# Patient Record
Sex: FEMALE | Race: WHITE | URBAN - METROPOLITAN AREA
[De-identification: names, ages, dates, MRNs, and addresses within clinical notes are randomized per-mention and may not be internally consistent; named-entity substitution may affect disease eponyms.]

---

## 2017-04-10 ENCOUNTER — INPATIENT (INPATIENT)
Facility: HOSPITAL | Age: 77
LOS: 4 days | Discharge: HOME | End: 2017-04-15
Attending: INTERNAL MEDICINE | Admitting: INTERNAL MEDICINE
Payer: MEDICARE

## 2017-04-10 VITALS
DIASTOLIC BLOOD PRESSURE: 130 MMHG | SYSTOLIC BLOOD PRESSURE: 147 MMHG | RESPIRATION RATE: 18 BRPM | OXYGEN SATURATION: 99 % | HEART RATE: 50 BPM | TEMPERATURE: 98 F

## 2017-04-10 LAB
ALBUMIN SERPL ELPH-MCNC: 3.8 G/DL — SIGNIFICANT CHANGE UP (ref 3.3–5)
ALP SERPL-CCNC: 79 U/L — SIGNIFICANT CHANGE UP (ref 40–120)
ALT FLD-CCNC: 33 U/L — SIGNIFICANT CHANGE UP (ref 12–78)
ANION GAP SERPL CALC-SCNC: 10 MMOL/L — SIGNIFICANT CHANGE UP (ref 5–17)
AST SERPL-CCNC: 34 U/L — SIGNIFICANT CHANGE UP (ref 15–37)
BASOPHILS # BLD AUTO: 0.1 K/UL — SIGNIFICANT CHANGE UP (ref 0–0.2)
BASOPHILS NFR BLD AUTO: 0.9 % — SIGNIFICANT CHANGE UP (ref 0–2)
BILIRUB SERPL-MCNC: 0.8 MG/DL — SIGNIFICANT CHANGE UP (ref 0.2–1.2)
BUN SERPL-MCNC: 14 MG/DL — SIGNIFICANT CHANGE UP (ref 7–23)
CALCIUM SERPL-MCNC: 8.9 MG/DL — SIGNIFICANT CHANGE UP (ref 8.5–10.1)
CHLORIDE SERPL-SCNC: 100 MMOL/L — SIGNIFICANT CHANGE UP (ref 96–108)
CK SERPL-CCNC: 117 U/L — SIGNIFICANT CHANGE UP (ref 26–192)
CO2 SERPL-SCNC: 26 MMOL/L — SIGNIFICANT CHANGE UP (ref 22–31)
CREAT SERPL-MCNC: 0.86 MG/DL — SIGNIFICANT CHANGE UP (ref 0.5–1.3)
EOSINOPHIL # BLD AUTO: 0.1 K/UL — SIGNIFICANT CHANGE UP (ref 0–0.5)
EOSINOPHIL NFR BLD AUTO: 0.7 % — SIGNIFICANT CHANGE UP (ref 0–6)
GLUCOSE SERPL-MCNC: 132 MG/DL — HIGH (ref 70–99)
HCT VFR BLD CALC: 45.2 % — HIGH (ref 34.5–45)
HGB BLD-MCNC: 15.4 G/DL — SIGNIFICANT CHANGE UP (ref 11.5–15.5)
LYMPHOCYTES # BLD AUTO: 15.4 % — SIGNIFICANT CHANGE UP (ref 13–44)
LYMPHOCYTES # BLD AUTO: 2.2 K/UL — SIGNIFICANT CHANGE UP (ref 1–3.3)
MCHC RBC-ENTMCNC: 27.5 PG — SIGNIFICANT CHANGE UP (ref 27–34)
MCHC RBC-ENTMCNC: 34 GM/DL — SIGNIFICANT CHANGE UP (ref 32–36)
MCV RBC AUTO: 81.1 FL — SIGNIFICANT CHANGE UP (ref 80–100)
MONOCYTES # BLD AUTO: 1.1 K/UL — HIGH (ref 0–0.9)
MONOCYTES NFR BLD AUTO: 7.5 % — SIGNIFICANT CHANGE UP (ref 2–14)
NEUTROPHILS # BLD AUTO: 10.8 K/UL — HIGH (ref 1.8–7.4)
NEUTROPHILS NFR BLD AUTO: 75.6 % — SIGNIFICANT CHANGE UP (ref 43–77)
PLATELET # BLD AUTO: 350 K/UL — SIGNIFICANT CHANGE UP (ref 150–400)
POTASSIUM SERPL-MCNC: 4 MMOL/L — SIGNIFICANT CHANGE UP (ref 3.5–5.3)
POTASSIUM SERPL-SCNC: 4 MMOL/L — SIGNIFICANT CHANGE UP (ref 3.5–5.3)
PROT SERPL-MCNC: 7.9 GM/DL — SIGNIFICANT CHANGE UP (ref 6–8.3)
RBC # BLD: 5.58 M/UL — HIGH (ref 3.8–5.2)
RBC # FLD: 13.9 % — SIGNIFICANT CHANGE UP (ref 10.3–14.5)
SODIUM SERPL-SCNC: 136 MMOL/L — SIGNIFICANT CHANGE UP (ref 135–145)
TROPONIN I SERPL-MCNC: <0.015 NG/ML — SIGNIFICANT CHANGE UP (ref 0.01–0.04)
WBC # BLD: 14.2 K/UL — HIGH (ref 3.8–10.5)
WBC # FLD AUTO: 14.2 K/UL — HIGH (ref 3.8–10.5)

## 2017-04-10 PROCEDURE — 93010 ELECTROCARDIOGRAM REPORT: CPT

## 2017-04-10 PROCEDURE — 71020: CPT | Mod: 26

## 2017-04-10 RX ADMIN — Medication 0.5 MILLIGRAM(S): at 23:11

## 2017-04-10 NOTE — ED PROVIDER NOTE - CONSTITUTIONAL, MLM
normal... Well appearing, well nourished, awake, alert, oriented to person, place, time/situation and in no apparent distress. Pt appears anxious.

## 2017-04-10 NOTE — ED STATDOCS - OBJECTIVE STATEMENT
75 y/o F with hx of HTN and anxiety presents to the ED c/o cough, and burning sensation, N/V. Pt states she started taking zoloft 2 weeks ago. Pt states she saw her PMD today for URI symptoms and panic attack this week, she was given nebulizer and took 1 zoloft, then when she got home she had episode of vomiting and burning sensation. Daughter also notes pt had abnormal EKG at PMD's office. Currently pt has no other complaints and denies fever.

## 2017-04-10 NOTE — ED ADULT NURSE NOTE - OBJECTIVE STATEMENT
Patient states she has had a cough for the past two weeks. Was at her PMD this morning was given half of an albuterol treatment and half a xanax, Patient states she has anxiety that she has been treated for in the past. When she got home from PMD she vomited.

## 2017-04-10 NOTE — ED ADULT NURSE REASSESSMENT NOTE - NS ED NURSE REASSESS COMMENT FT1
Report taken at the change of shift. Pt awake alert and oriented x4 resting comfortably in bed with no acute distress noted. Tachycardia noted on monitor. Denies any pain or complaints. Family at bedside. Dr. Omalley at bedside evaluating the pt. Will cont to monitor for safety and comfort.

## 2017-04-10 NOTE — ED PROVIDER NOTE - CARE PLAN
Principal Discharge DX:	Chest pain  Secondary Diagnosis:	Arrhythmia  Secondary Diagnosis:	Essential hypertension

## 2017-04-10 NOTE — ED PROVIDER NOTE - OBJECTIVE STATEMENT
77 y/o female with PMhx of anxiety, depression, HTN presents to the ED c/o increasing cough, generalized fatigue and increased stress that started 2 week ago. Pt states that she saw Dr. Ortega(PMD) a few weeks ago and was switched from xanax to zoloft. She states that her cough worsened and she saw Dr. Ortega again today whi gave her a nebulizer tx secondary to wheezing and told her that her "heart beat was irregular". Pt reports when coming home she started to have chest pain described as a burning with associated nausea and vomiting. After the episode of vomiting she ate chocolate, vomited again and then "felt warm and afraid". Currently pt has no other complaints states that the chest pain has resolved and denies abd pain, fever and SOB. PMD Dr. Ortega.

## 2017-04-10 NOTE — ED PROVIDER NOTE - MUSCULOSKELETAL, MLM
Spine appears normal, range of motion is not limited, no muscle or joint tenderness. No bilateral LE edema.

## 2017-04-10 NOTE — ED PROVIDER NOTE - NS ED MD SCRIBE ATTENDING SCRIBE SECTIONS
HISTORY OF PRESENT ILLNESS/DISPOSITION/PROGRESS NOTE/RESULTS/REVIEW OF SYSTEMS/PAST MEDICAL/SURGICAL/SOCIAL HISTORY/PHYSICAL EXAM

## 2017-04-10 NOTE — ED STATDOCS - PROGRESS NOTE DETAILS
Lnidsay Massey: 77 y/o F with hx of HTN and anxiety presents to the ED c/o cough, and burning sensation, N/V. Pt states she started taking zoloft 2 weeks ago. Pt states she saw her PMD today for URI symptoms and panic attack this week, she was given nebulizer and took 1 zoloft, then when she got home she had episode of vomiting and burning sensation. Daughter also notes pt had abnormal EKG at PMD's office. Currently pt has no other complaints and denies fever. Pt to be sent to main ED for further evaluation.

## 2017-04-10 NOTE — ED PROVIDER NOTE - MEDICAL DECISION MAKING DETAILS
75 yo f with hx htn, with recent cough, here for episode of chest pain with vomiting.  Pt in second degree heart block but is tachycardic.To be admitted and treated.

## 2017-04-11 DIAGNOSIS — I47.1 SUPRAVENTRICULAR TACHYCARDIA: ICD-10-CM

## 2017-04-11 LAB
ALBUMIN SERPL ELPH-MCNC: 3.5 G/DL — SIGNIFICANT CHANGE UP (ref 3.3–5)
ALP SERPL-CCNC: 73 U/L — SIGNIFICANT CHANGE UP (ref 40–120)
ALT FLD-CCNC: 25 U/L — SIGNIFICANT CHANGE UP (ref 12–78)
ANION GAP SERPL CALC-SCNC: 12 MMOL/L — SIGNIFICANT CHANGE UP (ref 5–17)
APPEARANCE UR: (no result)
AST SERPL-CCNC: 26 U/L — SIGNIFICANT CHANGE UP (ref 15–37)
BACTERIA # UR AUTO: (no result)
BASOPHILS # BLD AUTO: 0.1 K/UL — SIGNIFICANT CHANGE UP (ref 0–0.2)
BASOPHILS NFR BLD AUTO: 1.1 % — SIGNIFICANT CHANGE UP (ref 0–2)
BILIRUB SERPL-MCNC: 1.2 MG/DL — SIGNIFICANT CHANGE UP (ref 0.2–1.2)
BILIRUB UR-MCNC: NEGATIVE — SIGNIFICANT CHANGE UP
BUN SERPL-MCNC: 12 MG/DL — SIGNIFICANT CHANGE UP (ref 7–23)
CALCIUM SERPL-MCNC: 8.8 MG/DL — SIGNIFICANT CHANGE UP (ref 8.5–10.1)
CHLORIDE SERPL-SCNC: 101 MMOL/L — SIGNIFICANT CHANGE UP (ref 96–108)
CHOLEST SERPL-MCNC: 214 MG/DL — HIGH (ref 10–199)
CK SERPL-CCNC: 133 U/L — SIGNIFICANT CHANGE UP (ref 26–192)
CK SERPL-CCNC: 196 U/L — HIGH (ref 26–192)
CO2 SERPL-SCNC: 25 MMOL/L — SIGNIFICANT CHANGE UP (ref 22–31)
COLOR SPEC: YELLOW — SIGNIFICANT CHANGE UP
CREAT SERPL-MCNC: 0.84 MG/DL — SIGNIFICANT CHANGE UP (ref 0.5–1.3)
DIFF PNL FLD: (no result)
EOSINOPHIL # BLD AUTO: 0.1 K/UL — SIGNIFICANT CHANGE UP (ref 0–0.5)
EOSINOPHIL NFR BLD AUTO: 1.3 % — SIGNIFICANT CHANGE UP (ref 0–6)
EPI CELLS # UR: (no result)
GLUCOSE SERPL-MCNC: 130 MG/DL — HIGH (ref 70–99)
GLUCOSE UR QL: NEGATIVE MG/DL — SIGNIFICANT CHANGE UP
HCT VFR BLD CALC: 44.3 % — SIGNIFICANT CHANGE UP (ref 34.5–45)
HDLC SERPL-MCNC: 40 MG/DL — SIGNIFICANT CHANGE UP (ref 40–125)
HGB BLD-MCNC: 15.1 G/DL — SIGNIFICANT CHANGE UP (ref 11.5–15.5)
INR BLD: 1.21 RATIO — HIGH (ref 0.88–1.16)
KETONES UR-MCNC: NEGATIVE — SIGNIFICANT CHANGE UP
LEUKOCYTE ESTERASE UR-ACNC: (no result)
LIPID PNL WITH DIRECT LDL SERPL: 142 MG/DL — HIGH
LYMPHOCYTES # BLD AUTO: 2.4 K/UL — SIGNIFICANT CHANGE UP (ref 1–3.3)
LYMPHOCYTES # BLD AUTO: 21.8 % — SIGNIFICANT CHANGE UP (ref 13–44)
MAGNESIUM SERPL-MCNC: 2.4 MG/DL — SIGNIFICANT CHANGE UP (ref 1.8–2.4)
MCHC RBC-ENTMCNC: 27.9 PG — SIGNIFICANT CHANGE UP (ref 27–34)
MCHC RBC-ENTMCNC: 34.1 GM/DL — SIGNIFICANT CHANGE UP (ref 32–36)
MCV RBC AUTO: 81.9 FL — SIGNIFICANT CHANGE UP (ref 80–100)
MONOCYTES # BLD AUTO: 1 K/UL — HIGH (ref 0–0.9)
MONOCYTES NFR BLD AUTO: 9.3 % — SIGNIFICANT CHANGE UP (ref 2–14)
NEUTROPHILS # BLD AUTO: 7.3 K/UL — SIGNIFICANT CHANGE UP (ref 1.8–7.4)
NEUTROPHILS NFR BLD AUTO: 66.5 % — SIGNIFICANT CHANGE UP (ref 43–77)
NITRITE UR-MCNC: NEGATIVE — SIGNIFICANT CHANGE UP
PH UR: 6 — SIGNIFICANT CHANGE UP (ref 4.8–8)
PHOSPHATE SERPL-MCNC: 3.3 MG/DL — SIGNIFICANT CHANGE UP (ref 2.5–4.5)
PLATELET # BLD AUTO: 320 K/UL — SIGNIFICANT CHANGE UP (ref 150–400)
POTASSIUM SERPL-MCNC: 3.8 MMOL/L — SIGNIFICANT CHANGE UP (ref 3.5–5.3)
POTASSIUM SERPL-SCNC: 3.8 MMOL/L — SIGNIFICANT CHANGE UP (ref 3.5–5.3)
PROT SERPL-MCNC: 7.6 GM/DL — SIGNIFICANT CHANGE UP (ref 6–8.3)
PROT UR-MCNC: 15 MG/DL
PROTHROM AB SERPL-ACNC: 13.1 SEC — HIGH (ref 9.8–12.7)
RAPID RVP RESULT: SIGNIFICANT CHANGE UP
RBC # BLD: 5.41 M/UL — HIGH (ref 3.8–5.2)
RBC # FLD: 14.1 % — SIGNIFICANT CHANGE UP (ref 10.3–14.5)
RBC CASTS # UR COMP ASSIST: (no result) /HPF (ref 0–4)
SODIUM SERPL-SCNC: 138 MMOL/L — SIGNIFICANT CHANGE UP (ref 135–145)
SP GR SPEC: 1.01 — SIGNIFICANT CHANGE UP (ref 1.01–1.02)
TOTAL CHOLESTEROL/HDL RATIO MEASUREMENT: 5.4 RATIO — SIGNIFICANT CHANGE UP (ref 3.3–7.1)
TRIGL SERPL-MCNC: 160 MG/DL — HIGH (ref 10–149)
TROPONIN I SERPL-MCNC: <0.015 NG/ML — SIGNIFICANT CHANGE UP (ref 0.01–0.04)
UROBILINOGEN FLD QL: NEGATIVE MG/DL — SIGNIFICANT CHANGE UP
WBC # BLD: 11 K/UL — HIGH (ref 3.8–10.5)
WBC # FLD AUTO: 11 K/UL — HIGH (ref 3.8–10.5)
WBC UR QL: >50

## 2017-04-11 PROCEDURE — 93306 TTE W/DOPPLER COMPLETE: CPT | Mod: 26

## 2017-04-11 PROCEDURE — 93010 ELECTROCARDIOGRAM REPORT: CPT

## 2017-04-11 PROCEDURE — 99285 EMERGENCY DEPT VISIT HI MDM: CPT

## 2017-04-11 RX ORDER — ACETAMINOPHEN 500 MG
650 TABLET ORAL EVERY 6 HOURS
Qty: 0 | Refills: 0 | Status: DISCONTINUED | OUTPATIENT
Start: 2017-04-11 | End: 2017-04-15

## 2017-04-11 RX ORDER — SERTRALINE 25 MG/1
50 TABLET, FILM COATED ORAL DAILY
Qty: 0 | Refills: 0 | Status: DISCONTINUED | OUTPATIENT
Start: 2017-04-11 | End: 2017-04-15

## 2017-04-11 RX ORDER — ASPIRIN/CALCIUM CARB/MAGNESIUM 324 MG
81 TABLET ORAL DAILY
Qty: 0 | Refills: 0 | Status: DISCONTINUED | OUTPATIENT
Start: 2017-04-11 | End: 2017-04-15

## 2017-04-11 RX ORDER — SODIUM CHLORIDE 9 MG/ML
1000 INJECTION INTRAMUSCULAR; INTRAVENOUS; SUBCUTANEOUS
Qty: 0 | Refills: 0 | Status: DISCONTINUED | OUTPATIENT
Start: 2017-04-11 | End: 2017-04-15

## 2017-04-11 RX ORDER — ENOXAPARIN SODIUM 100 MG/ML
30 INJECTION SUBCUTANEOUS EVERY 24 HOURS
Qty: 0 | Refills: 0 | Status: DISCONTINUED | OUTPATIENT
Start: 2017-04-11 | End: 2017-04-15

## 2017-04-11 RX ORDER — AMLODIPINE BESYLATE 2.5 MG/1
10 TABLET ORAL DAILY
Qty: 0 | Refills: 0 | Status: DISCONTINUED | OUTPATIENT
Start: 2017-04-11 | End: 2017-04-15

## 2017-04-11 RX ADMIN — AMLODIPINE BESYLATE 10 MILLIGRAM(S): 2.5 TABLET ORAL at 05:49

## 2017-04-11 RX ADMIN — SERTRALINE 50 MILLIGRAM(S): 25 TABLET, FILM COATED ORAL at 14:36

## 2017-04-11 RX ADMIN — ENOXAPARIN SODIUM 30 MILLIGRAM(S): 100 INJECTION SUBCUTANEOUS at 23:45

## 2017-04-11 RX ADMIN — Medication 81 MILLIGRAM(S): at 12:44

## 2017-04-11 RX ADMIN — SODIUM CHLORIDE 50 MILLILITER(S): 9 INJECTION INTRAMUSCULAR; INTRAVENOUS; SUBCUTANEOUS at 05:59

## 2017-04-11 NOTE — CONSULT NOTE ADULT - SUBJECTIVE AND OBJECTIVE BOX
HPI:  77 yo F with PMH of HTN, anxiety, depression, p/w cough x 2 weeks. Patient states that she was prescribed zithromax and took it for 3 days. However, she developed diarrhea, so she stopped taking it. After that she became constipated for 3 days, and then developed diarrhea again. Her cough kept getting worse. Today patient started new medication zoloft as per PCP, Dr. Ortega, is attempted to transition patient from xanax to zoloft. Later on she developed chest burning sensation and then had two episodes of vomiting. Presently CP has resolved. Patient got scared and came to ED.    Patient states that he was also told by Dr. Ortega that her heart beat was irregular.     Presently c/o feeling nervous / anxious, but denies any other medical complaints. Denies dysuria / increased frequency/ hematuria. (2017 04:43)      PAST MEDICAL & SURGICAL HISTORY:  HTN (hypertension)  Anxiety      REVIEW OF SYSTEMS:    General: Pt denies recent weight loss/fever/chills    Neurological: denies numbness or  sensation loss    HEENT: denies visual changes, no hearing loss, denies sore throat    Cardiovascular: denies chest pain/palpitations/leg edema    Respiratory and Thorax: denies SOB/cough/wheezing    Gastrointestinal: denies abdominal pain/diarrhea/constipation/bloody stool    Genitourinary: denies urinary frequency/urgency/ dysuria    Musculoskeletal: denies joint pain or swelling, denies restricted motion    Skin: denies rashes/sores    Endocrine: denies heat or cold intolerance/excessive thirst    Hematologic: denies abnormal bleeding  	    	  	    		        	    	            MEDICATIONS  (STANDING):  sertraline 50milliGRAM(s) Oral daily  amLODIPine   Tablet 10milliGRAM(s) Oral daily  levoFLOXacin IVPB  IV Intermittent   sodium chloride 0.9%. 1000milliLiter(s) IV Continuous <Continuous>  aspirin  chewable 81milliGRAM(s) Oral daily    MEDICATIONS  (PRN):  acetaminophen   Tablet 650milliGRAM(s) Oral every 6 hours PRN For Temp greater than 38 C (100.4 F)      Allergies    Inderal LA (Unknown)  penicillins (Unknown)    Intolerances        SOCIAL HISTORY:    FAMILY HISTORY:      Vital Signs Last 24 Hrs  T(C): 36.8, Max: 36.8 ( @ 06:53)  T(F): 98.2, Max: 98.2 ( @ 06:53)  HR: 93 (50 - 116)  BP: 139/63 (111/79 - 147/130)  BP(mean): 139 (139 - 139)  RR: 20 (18 - 20)  SpO2: 98% (96% - 99%)    PHYSICAL EXAM:    Constitutional: well developed, well nourished, no deformities and no acute distress    Neurological: Alert & Oriented x 3, ODONNELL, no focal deficits    HEENT: NC/AT, PERRLA, EOMI,  Neck supple.    Respiratory: CTA B/L, No wheezing/crackles/rhonchi    Cardiovascular: (+) S1 & S2, RRR, No m/r/g    Gastrointestinal: soft, NT, nondistended, (+) BS    Genitourinary: non distended bladder, voiding freely    Extremities: No pedal edema, No clubbing, No cyanosis    Skin:  normal skin color and pigmentation, no skin lesions            LABS:                        15.1   11.0  )-----------( 320      ( 2017 07:44 )             44.3     -    138  |  101  |  12  ----------------------------<  130<H>  3.8   |  25  |  0.84    Ca    8.8      2017 07:44  Phos  3.3     -  Mg     2.4         TPro  7.6  /  Alb  3.5  /  TBili  1.2  /  DBili  x   /  AST  26  /  ALT  25  /  AlkPhos  73  04-11    PT/INR - ( 2017 07:44 )   PT: 13.1 sec;   INR: 1.21 ratio           Urinalysis Basic - ( 10 Apr 2017 21:38 )    Color: Yellow / Appearance: Slightly Turbid / S.010 / pH: x  Gluc: x / Ketone: Negative  / Bili: Negative / Urobili: Negative mg/dL   Blood: x / Protein: 15 mg/dL / Nitrite: Negative   Leuk Esterase: Moderate / RBC: 11-25 /HPF / WBC >50   Sq Epi: x / Non Sq Epi: Moderate / Bacteria: Moderate        RADIOLOGY & ADDITIONAL STUDIES:    TELE:  EKG: HPI:  77 yo F with PMH of HTN, anxiety, depression, p/w cough x 2 weeks. Patient states that she was prescribed zithromax and took it for 3 days. However, she developed diarrhea, so she stopped taking it. After that she became constipated for 3 days, and then developed diarrhea again. Her cough kept getting worse. Today patient started new medication zoloft as per PCP, Dr. Ortega, is attempted to transition patient from xanax to zoloft. Later on she developed chest burning sensation and then had two episodes of vomiting. Presently CP has resolved. Patient got scared and came to ED.    Patient states that he was also told by Dr. Ortega that her heart beat was irregular.     Presently c/o feeling nervous / anxious, but denies any other medical complaints. Denies dysuria / increased frequency/ hematuria. (2017 04:43)      PAST MEDICAL & SURGICAL HISTORY:  HTN (hypertension)  Anxiety      REVIEW OF SYSTEMS:    General: Pt denies recent weight loss/fever/chills    Neurological: denies numbness or  sensation loss    HEENT: denies visual changes, no hearing loss, denies sore throat    Cardiovascular: denies chest pain/palpitations/leg edema    Respiratory and Thorax: denies SOB/cough/wheezing    Gastrointestinal: denies abdominal pain/diarrhea/constipation/bloody stool    Genitourinary: denies urinary frequency/urgency/ dysuria    Musculoskeletal: denies joint pain or swelling, denies restricted motion    Skin: denies rashes/sores    Endocrine: denies heat or cold intolerance/excessive thirst    Hematologic: denies abnormal bleeding  	    MEDICATIONS  (STANDING):  sertraline 50milliGRAM(s) Oral daily  amLODIPine   Tablet 10milliGRAM(s) Oral daily  levoFLOXacin IVPB  IV Intermittent   sodium chloride 0.9%. 1000milliLiter(s) IV Continuous <Continuous>  aspirin  chewable 81milliGRAM(s) Oral daily    MEDICATIONS  (PRN):  acetaminophen   Tablet 650milliGRAM(s) Oral every 6 hours PRN For Temp greater than 38 C (100.4 F)      Allergies    Inderal LA (Unknown)  penicillins (Unknown)    Intolerances      Vital Signs Last 24 Hrs  T(C): 36.8, Max: 36.8 ( @ 06:53)  T(F): 98.2, Max: 98.2 ( @ 06:53)  HR: 93 (50 - 116)  BP: 139/63 (111/79 - 147/130)  BP(mean): 139 (139 - 139)  RR: 20 (18 - 20)  SpO2: 98% (96% - 99%)    PHYSICAL EXAM:    Constitutional: well developed, well nourished, no deformities and no acute distress    Neurological: Alert & Oriented x 3, ODONNELL, no focal deficits    HEENT: NC/AT, PERRLA, EOMI,  Neck supple.    Respiratory: CTA B/L, No wheezing/crackles/rhonchi    Cardiovascular: (+) S1 & S2, RRR, No m/r/g    Gastrointestinal: soft, NT, nondistended, (+) BS    Genitourinary: non distended bladder, voiding freely    Extremities: No pedal edema, No clubbing, No cyanosis    Skin:  normal skin color and pigmentation, no skin lesions            LABS:                        15.1   11.0  )-----------( 320      ( 2017 07:44 )             44.3     04-11    138  |  101  |  12  ----------------------------<  130<H>  3.8   |  25  |  0.84    Ca    8.8      2017 07:44  Phos  3.3     04-11  Mg     2.4     04-11    TPro  7.6  /  Alb  3.5  /  TBili  1.2  /  DBili  x   /  AST  26  /  ALT  25  /  AlkPhos  73  04-11    PT/INR - ( 2017 07:44 )   PT: 13.1 sec;   INR: 1.21 ratio           Urinalysis Basic - ( 10 Apr 2017 21:38 )    Color: Yellow / Appearance: Slightly Turbid / S.010 / pH: x  Gluc: x / Ketone: Negative  / Bili: Negative / Urobili: Negative mg/dL   Blood: x / Protein: 15 mg/dL / Nitrite: Negative   Leuk Esterase: Moderate / RBC: 11-25 /HPF / WBC >50   Sq Epi: x / Non Sq Epi: Moderate / Bacteria: Moderate        RADIOLOGY & ADDITIONAL STUDIES:    TELE: -120 bpm MAT    EKG:sinus tachycardia 114bpm, variable MI interval,  HPI:  77 yo F with PMH of HTN, anxiety, depression, p/w cough x 2 weeks. Patient states that she was prescribed zithromax and took it for 3 days. However, she developed diarrhea, so she stopped taking it. After that she became constipated for 3 days, and then developed diarrhea again. Her cough kept getting worse. Today patient started new medication zoloft as per PCP, Dr. Ortega, is attempted to transition patient from xanax to zoloft. Later on she developed chest burning sensation and then had two episodes of vomiting. Presently CP has resolved. Patient got scared and came to ED.    Patient states that he was also told by Dr. Ortega that her heart beat was irregular.     Presently c/o feeling nervous / anxious, but denies any other medical complaints. Denies dysuria / increased frequency/ hematuria. (2017 04:43)      PAST MEDICAL & SURGICAL HISTORY:  HTN (hypertension)  Anxiety      REVIEW OF SYSTEMS:    General: Pt denies recent weight loss/fever/chills    Neurological: denies numbness or  sensation loss    HEENT: denies visual changes, no hearing loss, denies sore throat    Cardiovascular: denies chest pain/palpitations/leg edema    Respiratory and Thorax: denies SOB/wheezing (+) non productive cough    Gastrointestinal: denies abdominal pain/diarrhea/constipation/bloody stool    Genitourinary: denies urinary frequency/urgency/ dysuria    Musculoskeletal: denies joint pain or swelling, denies restricted motion    Skin: denies rashes/sores    Endocrine: denies heat or cold intolerance/excessive thirst    Hematologic: denies abnormal bleeding  	    MEDICATIONS  (STANDING):  sertraline 50milliGRAM(s) Oral daily  amLODIPine   Tablet 10milliGRAM(s) Oral daily  levoFLOXacin IVPB  IV Intermittent   sodium chloride 0.9%. 1000milliLiter(s) IV Continuous <Continuous>  aspirin  chewable 81milliGRAM(s) Oral daily    MEDICATIONS  (PRN):  acetaminophen   Tablet 650milliGRAM(s) Oral every 6 hours PRN For Temp greater than 38 C (100.4 F)      Allergies    Inderal LA (Unknown)  penicillins (Unknown)    Intolerances      Vital Signs Last 24 Hrs  T(C): 36.8, Max: 36.8 ( @ 06:53)  T(F): 98.2, Max: 98.2 (04-11 @ 06:53)  HR: 93 (50 - 116)  BP: 139/63 (111/79 - 147/130)  BP(mean): 139 (139 - 139)  RR: 20 (18 - 20)  SpO2: 98% (96% - 99%)    PHYSICAL EXAM:    Constitutional: well developed, well nourished, no deformities and no acute distress    Neurological: Alert & Oriented x 3, ODONNELL, no focal deficits    HEENT: NC/AT, PERRLA, EOMI,  Neck supple.    Respiratory: CTA B/L, No wheezing/crackles/rhonchi    Cardiovascular: irregular S1 & S2, No m/r/g    Gastrointestinal: soft, NT, nondistended, (+) BS    Genitourinary: non distended bladder, voiding freely    Extremities: No pedal edema, No clubbing, No cyanosis    Skin:  normal skin color and pigmentation, no skin lesions            LABS:                        15.1   11.0  )-----------( 320      ( 2017 07:44 )             44.3     -    138  |  101  |  12  ----------------------------<  130<H>  3.8   |  25  |  0.84    Ca    8.8      2017 07:44  Phos  3.3     -  Mg     2.4         TPro  7.6  /  Alb  3.5  /  TBili  1.2  /  DBili  x   /  AST  26  /  ALT  25  /  AlkPhos  73  04-11    PT/INR - ( 2017 07:44 )   PT: 13.1 sec;   INR: 1.21 ratio           Urinalysis Basic - ( 10 Apr 2017 21:38 )    Color: Yellow / Appearance: Slightly Turbid / S.010 / pH: x  Gluc: x / Ketone: Negative  / Bili: Negative / Urobili: Negative mg/dL   Blood: x / Protein: 15 mg/dL / Nitrite: Negative   Leuk Esterase: Moderate / RBC: 11-25 /HPF / WBC >50   Sq Epi: x / Non Sq Epi: Moderate / Bacteria: Moderate        RADIOLOGY & ADDITIONAL STUDIES:    TELE: -120 bpm MAT    EKG:sinus tachycardia 114bpm, variable MA interval,

## 2017-04-11 NOTE — CONSULT NOTE ADULT - ASSESSMENT
76F p/w SOB, cough, tachycardia.     1. Tachycardia- appears SR with prolonged ID, APCs. Elevated HR likely physiologic in setting of PNA, anxiety. EP eval pending. If HR does not become better controlled over next 24 hrs  will d/c norvasc and start lopressor 25mg BID.     2. SOB- likely from URI/PNA. Suggest noncontrast CT chest. Mgmt as per primary team.     3. HTN- well controlled. Cont norvasc for now.     4. Outpt cardiac w/u with me upon discharge.     5. DVT proph.     4.

## 2017-04-11 NOTE — H&P ADULT - ASSESSMENT
75 yo F with PMH of HTN, anxiety, depression, p/w cough x 2 weeks    *Sepsis 2/2 URI vs questionable CAP on RLL on CXR  -Final CXR report still pending  -levaquin   -RVP  -Blood cultures / sputum cultures   -IVF     *Tachycardia  -Possibly 2/2 anxiety?  -Will need to r/o underlying arrhythmia. Patient appears to have discernible P waves, but at times appears to be dissociated from QRS complex - possibly AV block? Will consult cardio for further input  -Patient also appears to alternate between tachycardia and bradycardia on tele monitor at times  -Echo    *Breif episode of CP  -ASA  -Tele monitoring  -Cardio consult  -MAGDY + Serial EKGs    *Depression / anxiety  -C/w zoloft    *Abnormal UA  -However, patient denies any UTI symptoms    *HTN  -Low salt diet  -C/w amlodipine  -As per patient, Enalapril is being held 2/2 cough    *Alternating constipation / diarrhea  -Possibly IBS?  -Continue to monitor, and outpatient f/u if symptoms improve    *DVT ppx  -SCDs 75 yo F with PMH of HTN, anxiety, depression, p/w cough x 2 weeks    *Sepsis 2/2 URI vs questionable CAP on RLL on CXR  -Final CXR report still pending  -levaquin   -RVP  -Blood cultures / sputum cultures   -IVF     *Tachycardia  -Possibly 2/2 anxiety?  -Will need to r/o underlying arrhythmia. Patient appears to have discernible P waves, but at times appears to be dissociated from QRS complex - possibly AV block? Will consult cardio for further input  -Patient also appears to alternate between tachycardia and bradycardia on tele monitor at times  -Echo    *Brief episode of CP  -ASA  -Tele monitoring  -Cardio consult  -MAGDY + Serial EKGs    *Depression / anxiety  -C/w zoloft    *Abnormal UA  -However, patient denies any UTI symptoms    *HTN  -Low salt diet  -C/w amlodipine  -As per patient, Enalapril is being held 2/2 cough    *Alternating constipation / diarrhea  -Possibly IBS?  -Continue to monitor, and outpatient f/u if symptoms improve    *DVT ppx  -SCDs

## 2017-04-11 NOTE — H&P ADULT - HISTORY OF PRESENT ILLNESS
75 yo F with PMH of HTN, anxiety, depression, p/w cough x 2 weeks. Patient states that she was prescribed zithromax and took it for 3 days. However, she developed diarrhea, so she stopped taking it. After that she became constipated for 3 days, and then developed diarrhea again. Her cough kept getting worse. Today patient started new medication zoloft as per PCP, Dr. Ortega, is attempted to transition patient from xanax to zoloft. Later on she developed chest burning sensation and then had two episodes of vomiting. Presently CP has resolved. Patient got scared and came to ED.    Patient states that he was also told by Dr. Ortega that her heart beat was irregular.     Presently c/o feeling nervous / anxious, but denies any other medical complaints. Denies dysuria / increased frequency/ hematuria.

## 2017-04-11 NOTE — CONSULT NOTE ADULT - SUBJECTIVE AND OBJECTIVE BOX
Cardiology Consultation    HPI:  77 yo F with PMH of HTN, anxiety, depression, p/w cough x 2 weeks. Patient states that she was prescribed zithromax and took it for 3 days. However, she developed diarrhea, so she stopped taking it. After that she became constipated for 3 days, and then developed diarrhea again. Her cough kept getting worse. Today patient started new medication zoloft as per PCP, Dr. Ortega, is attempted to transition patient from xanax to zoloft. Later on she developed chest burning sensation and then had two episodes of vomiting. Presently CP has resolved. Patient got scared and came to ED. Patient states that he was also told by Dr. Ortega that her heart beat was irregular.   Presently c/o feeling nervous / anxious, but denies any other medical complaints. Denies dysuria / increased frequency/ hematuria.     - No CP, SOB mildly improved. Pt very nervous/anxious. Tele- Stach with APCs.     PAST MEDICAL & SURGICAL HISTORY:  HTN (hypertension)  Anxiety    Allergies  Inderal LA (Unknown)  penicillins (Unknown)    Intolerances    SOCIAL HISTORY: Denies tobacco, etoh abuse or illicit drug use    FAMILY HISTORY: Noncontributory    MEDICATIONS  (STANDING):  sertraline 50milliGRAM(s) Oral daily  amLODIPine   Tablet 10milliGRAM(s) Oral daily  levoFLOXacin IVPB  IV Intermittent   sodium chloride 0.9%. 1000milliLiter(s) IV Continuous <Continuous>  aspirin  chewable 81milliGRAM(s) Oral daily    MEDICATIONS  (PRN):  acetaminophen   Tablet 650milliGRAM(s) Oral every 6 hours PRN For Temp greater than 38 C (100.4 F)    Vital Signs Last 24 Hrs  T(C): 36.8, Max: 36.8 ( @ 06:53)  T(F): 98.2, Max: 98.2 ( @ 06:53)  HR: 93 (50 - 116)  BP: 139/63 (111/79 - 147/130)  BP(mean): 139 (139 - 139)  RR: 20 (18 - 20)  SpO2: 98% (96% - 99%)    REVIEW OF SYSTEMS:    CONSTITUTIONAL:  As per HPI.  HEENT:  Eyes:  No diplopia or blurred vision. ENT:  No earache, sore throat or runny nose.  CARDIOVASCULAR:  No pressure, squeezing, strangling, tightness, heaviness or aching about the chest, neck, axilla or epigastrium.  RESPIRATORY:  No cough, shortness of breath, PND or orthopnea.  GASTROINTESTINAL:  No nausea, vomiting or diarrhea.  GENITOURINARY:  No dysuria, frequency or urgency.  MUSCULOSKELETAL:  As per HPI.  SKIN:  No change in skin, hair or nails.  NEUROLOGIC:  No paresthesias, fasciculations, seizures or weakness.  PSYCHIATRIC:  No disorder of thought or mood.  ENDOCRINE:  No heat or cold intolerance, polyuria or polydipsia.  HEMATOLOGICAL:  No easy bruising or bleedings.     PHYSICAL EXAMINATION:    GENERAL APPEARANCE:  Pt. is not currently dyspneic, in no distress. Pt. is alert, oriented, and pleasant.  HEENT:  Pupils are normal and react normally. No icterus. Mucous membranes well colored.  NECK:  Supple. No lymphadenopathy. Jugular venous pressure not elevated. Carotids equal.   HEART:   The cardiac impulse has a normal quality. There are no murmurs, rubs or gallops noted  CHEST:  Chest is clear to auscultation. Normal respiratory effort.  ABDOMEN:  Soft and nontender.   EXTREMITIES:  There is no edema.   SKIN:  No rash or significant lesions are noted.    I&O's Summary      LABS:                        15.4   14.2  )-----------( 350      ( 10 Apr 2017 21:38 )             45.2     04-10    136  |  100  |  14  ----------------------------<  132<H>  4.0   |  26  |  0.86    Ca    8.9      10 Apr 2017 21:38    TPro  7.9  /  Alb  3.8  /  TBili  0.8  /  DBili  x   /  AST  34  /  ALT  33  /  AlkPhos  79  04-10    LIVER FUNCTIONS - ( 10 Apr 2017 21:38 )  Alb: 3.8 g/dL / Pro: 7.9 gm/dL / ALK PHOS: 79 U/L / ALT: 33 U/L / AST: 34 U/L / GGT: x           PT/INR - ( 2017 07:44 )   PT: 13.1 sec;   INR: 1.21 ratio      CARDIAC MARKERS ( 10 Apr 2017 21:38 )  <0.015 ng/mL / x     / 117 U/L / x     / x        Urinalysis Basic - ( 10 Apr 2017 21:38 )    Color: Yellow / Appearance: Slightly Turbid / S.010 / pH: x  Gluc: x / Ketone: Negative  / Bili: Negative / Urobili: Negative mg/dL   Blood: x / Protein: 15 mg/dL / Nitrite: Negative   Leuk Esterase: Moderate / RBC: 11-25 /HPF / WBC >50   Sq Epi: x / Non Sq Epi: Moderate / Bacteria: Moderate    EKG: STach with prolonged AZ, APCs.     TELEMETRY: Stach    CARDIAC TESTS: pending    RADIOLOGY & ADDITIONAL STUDIES: pending    ASSESSMENT & PLAN:

## 2017-04-11 NOTE — PROGRESS NOTE ADULT - SUBJECTIVE AND OBJECTIVE BOX
PMD: Dr. Ortega    HPI:77 yo F with PMH of HTN, anxiety, depression, p/w cough x 2 weeks. Patient states that she was prescribed zithromax and took it for 3 days. However, she developed diarrhea, so she stopped taking it. After that she became constipated for 3 days, and then developed diarrhea again. Her cough kept getting worse. Today patient started new medication zoloft as per PCP, Dr. Ortega, is attempted to transition patient from xanax to zoloft. Later on she developed chest burning sensation and then had two episodes of vomiting. Presently CP has resolved. Patient got scared and came to ED.  Patient states that he was also told by Dr. Ortega that her heart beat was irregular.   Presently c/o feeling nervous / anxious,  but denies any other medical complaints. Denies dysuria / increased frequency/ hematuria.     4/11- reviewed.  Pt admitted this morning.  No events.     Vital Signs Last 24 Hrs  T(C): 36.5, Max: 36.8 (04-11 @ 06:53)  T(F): 97.7, Max: 98.2 (04-11 @ 06:53)  HR: 105 (50 - 116)  BP: 140/76 (111/79 - 147/130)  BP(mean): 139 (139 - 139)  RR: 18 (18 - 20)  SpO2: 94% (94% - 99%)        LABS: All Labs Reviewed:                        15.1   11.0  )-----------( 320      ( 11 Apr 2017 07:44 )             44.3     04-11    138  |  101  |  12  ----------------------------<  130<H>  3.8   |  25  |  0.84    Ca    8.8      11 Apr 2017 07:44  Phos  3.3     04-11  Mg     2.4     04-11    TPro  7.6  /  Alb  3.5  /  TBili  1.2  /  DBili  x   /  AST  26  /  ALT  25  /  AlkPhos  73  04-11    PT/INR - ( 11 Apr 2017 07:44 )   PT: 13.1 sec;   INR: 1.21 ratio           CARDIAC MARKERS ( 11 Apr 2017 12:18 )  x     / x     / 196 U/L / x     / x      CARDIAC MARKERS ( 11 Apr 2017 07:44 )  <0.015 ng/mL / x     / 133 U/L / x     / x      CARDIAC MARKERS ( 10 Apr 2017 21:38 )  <0.015 ng/mL / x     / 117 U/L / x     / x          CHEST P.A. LATERAL        04/10/2017    IMPRESSION:  No evidence of active chest disease.       MEDICATIONS  (STANDING):  sertraline 50milliGRAM(s) Oral daily  amLODIPine   Tablet 10milliGRAM(s) Oral daily  levoFLOXacin IVPB  IV Intermittent   sodium chloride 0.9%. 1000milliLiter(s) IV Continuous <Continuous>  aspirin  chewable 81milliGRAM(s) Oral daily    MEDICATIONS  (PRN):  acetaminophen   Tablet 650milliGRAM(s) Oral every 6 hours PRN For Temp greater than 38 C (100.4 F)        Assessment and Plan:   Assessment:  · Assessment		    77 yo F with PMH of HTN, anxiety, depression, p/w cough x 2 weeks    *cough, leukocytosis, URI  - CXR w/o infiltrate  - wbc improving, afebrile  - resp viral panel neg.   - continue IV levaquin for now   - cultures pending      *UTI  - on levaquin as above  - check urine culture and change abx pending sensitivities    *tachycardia  - monitor on tele   - trops neg  - echo pending  - Cardio and EP consults     *Brief episode of CP  -trops neg.   -aspirin  - ekg in am  - Cardio f/u appreciated     *Depression / anxiety  -C/w zoloft    *HTN  -Low salt diet  -C/w amlodipine  -As per patient, Enalapril is being held 2/2 cough    *Alternating constipation / diarrhea  -Possibly IBS?  -Continue to monitor, and outpatient f/u if symptoms improve    *DVT ppx  -SCDs

## 2017-04-11 NOTE — CONSULT NOTE ADULT - ASSESSMENT
75 yo F with PMH of HTN, anxiety, depression, p/w cough x 2 weeks. Patient states that she was prescribed zithromax and took it for 3 days. However, she developed diarrhea, so she stopped taking it. After that she became constipated for 3 days, and then developed diarrhea again. Her cough kept getting worse. Today patient started new medication zoloft as per PCP, Dr. Oretga, is attempted to transition patient from xanax to zoloft. Later on she developed chest burning sensation and then had two episodes of vomiting. Presently CP has resolved. Patient got scared and came to ED.    Patient states that he was also told by Dr. Ortega that her heart beat was irregular.     Presently c/o feeling nervous / anxious, but denies any other medical complaints. Denies dysuria / increased frequency/ hematuria. (11 Apr 2017 04:43)    EKG & telemetry reviewed : tachycardia 110-120's bpm , likely MAT associated with URI

## 2017-04-12 LAB
ANION GAP SERPL CALC-SCNC: 8 MMOL/L — SIGNIFICANT CHANGE UP (ref 5–17)
BUN SERPL-MCNC: 11 MG/DL — SIGNIFICANT CHANGE UP (ref 7–23)
CALCIUM SERPL-MCNC: 8.8 MG/DL — SIGNIFICANT CHANGE UP (ref 8.5–10.1)
CHLORIDE SERPL-SCNC: 102 MMOL/L — SIGNIFICANT CHANGE UP (ref 96–108)
CO2 SERPL-SCNC: 27 MMOL/L — SIGNIFICANT CHANGE UP (ref 22–31)
CREAT SERPL-MCNC: 0.67 MG/DL — SIGNIFICANT CHANGE UP (ref 0.5–1.3)
CULTURE RESULTS: SIGNIFICANT CHANGE UP
GLUCOSE SERPL-MCNC: 125 MG/DL — HIGH (ref 70–99)
HCT VFR BLD CALC: 43.4 % — SIGNIFICANT CHANGE UP (ref 34.5–45)
HGB BLD-MCNC: 14.4 G/DL — SIGNIFICANT CHANGE UP (ref 11.5–15.5)
MCHC RBC-ENTMCNC: 27.5 PG — SIGNIFICANT CHANGE UP (ref 27–34)
MCHC RBC-ENTMCNC: 33.2 GM/DL — SIGNIFICANT CHANGE UP (ref 32–36)
MCV RBC AUTO: 82.8 FL — SIGNIFICANT CHANGE UP (ref 80–100)
PLATELET # BLD AUTO: 264 K/UL — SIGNIFICANT CHANGE UP (ref 150–400)
POTASSIUM SERPL-MCNC: 3.7 MMOL/L — SIGNIFICANT CHANGE UP (ref 3.5–5.3)
POTASSIUM SERPL-SCNC: 3.7 MMOL/L — SIGNIFICANT CHANGE UP (ref 3.5–5.3)
RBC # BLD: 5.24 M/UL — HIGH (ref 3.8–5.2)
RBC # FLD: 13.9 % — SIGNIFICANT CHANGE UP (ref 10.3–14.5)
SODIUM SERPL-SCNC: 137 MMOL/L — SIGNIFICANT CHANGE UP (ref 135–145)
SPECIMEN SOURCE: SIGNIFICANT CHANGE UP
WBC # BLD: 10.3 K/UL — SIGNIFICANT CHANGE UP (ref 3.8–10.5)
WBC # FLD AUTO: 10.3 K/UL — SIGNIFICANT CHANGE UP (ref 3.8–10.5)

## 2017-04-12 PROCEDURE — 93010 ELECTROCARDIOGRAM REPORT: CPT

## 2017-04-12 RX ORDER — ALPRAZOLAM 0.25 MG
0.25 TABLET ORAL DAILY
Qty: 0 | Refills: 0 | Status: DISCONTINUED | OUTPATIENT
Start: 2017-04-12 | End: 2017-04-13

## 2017-04-12 RX ADMIN — ENOXAPARIN SODIUM 30 MILLIGRAM(S): 100 INJECTION SUBCUTANEOUS at 23:13

## 2017-04-12 RX ADMIN — AMLODIPINE BESYLATE 10 MILLIGRAM(S): 2.5 TABLET ORAL at 05:38

## 2017-04-12 RX ADMIN — Medication 81 MILLIGRAM(S): at 12:58

## 2017-04-12 NOTE — PROGRESS NOTE ADULT - SUBJECTIVE AND OBJECTIVE BOX
PMD: Dr. Ortega    HPI:75 yo F with PMH of HTN, anxiety, depression, p/w cough x 2 weeks. Patient states that she was prescribed zithromax and took it for 3 days. However, she developed diarrhea, so she stopped taking it. After that she became constipated for 3 days, and then developed diarrhea again. Her cough kept getting worse. Today patient started new medication zoloft as per PCP, Dr. Ortega, is attempted to transition patient from xanax to zoloft. Later on she developed chest burning sensation and then had two episodes of vomiting. Presently CP has resolved. Patient got scared and came to ED.  Patient states that he was also told by Dr. Ortega that her heart beat was irregular.   Presently c/o feeling nervous / anxious,  but denies any other medical complaints. Denies dysuria / increased frequency/ hematuria.     4/11- reviewed.  Pt admitted this morning.  No events.     4/12-Cough resolved.  Denies chest pain, sob, palpitations.  Denies urinary f/u/d or abd pain. Feeling anxious- states she has longstanding hx anxiety and was just started on zoloft Monday and is supposed to taper off xanax which she hasn't received here.   Tele w/ atrial tach. up to 150s.  Pt states she hasn't tolerated BB in past but was once on cardizem and tolerated well.     VVital Signs Last 24 Hrs  T(C): 36.9, Max: 36.9 (04-12 @ 10:36)  T(F): 98.4, Max: 98.4 (04-12 @ 10:36)  HR: 116 (103 - 116)  BP: 133/82 (133/82 - 171/87)  BP(mean): --  RR: 18 (18 - 18)  SpO2: 93% (93% - 97%)    T(C): 36.5, Max: 36.8 (04-11 @ 06:53)  T(F): 97.7, Max: 98.2 (04-11 @ 06:53)  HR: 105 (50 - 116)  BP: 140/76 (111/79 - 147/130)  BP(mean): 139 (139 - 139)  RR: 18 (18 - 20)  SpO2: 94% (94% - 99%)      PHYSICAL EXAM:    General: pleasant, resting comfortably in bed, nad  Neuro: AAOx3, no focal deficits  HEENT: NCAT  Neck: Soft and supple, No JVD  Respiratory: CTA b/l, no w/r/r  Cardiovascular: S1 and S2, tachy, no m/g/r  Gastrointestinal: +BS, soft, NTND, no rebound/guarding  Extremities: No c/c/e  Vascular: 2+ peripheral pulses  Musculoskeletal: 5/5 strength b/l UE and LE, sensation intact  Skin: No rashes            LABS: All Labs Reviewed:                        14.4   10.3  )-----------( 264      ( 12 Apr 2017 06:02 )             43.4     04-12    137  |  102  |  11  ----------------------------<  125<H>  3.7   |  27  |  0.67    Ca    8.8      12 Apr 2017 06:02  Phos  3.3     04-11  Mg     2.4     04-11    TPro  7.6  /  Alb  3.5  /  TBili  1.2  /  DBili  x   /  AST  26  /  ALT  25  /  AlkPhos  73  04-11    PT/INR - ( 11 Apr 2017 07:44 )   PT: 13.1 sec;   INR: 1.21 ratio           CARDIAC MARKERS ( 11 Apr 2017 12:18 )  x     / x     / 196 U/L / x     / x      CARDIAC MARKERS ( 11 Apr 2017 07:44 )  <0.015 ng/mL / x     / 133 U/L / x     / x      CARDIAC MARKERS ( 10 Apr 2017 21:38 )  <0.015 ng/mL / x     / 117 U/L / x     / x            Culture - Urine (04.10.17 @ 21:38)    Specimen Source: .Urine Clean Catch (Midstream)    Culture Results:   >100,000 CFU/ml Streptococcus agalactiae (Group B)      Culture - Blood (04.11.17 @ 07:44)    Specimen Source: .Blood None    Culture Results:   No growth to date.    CHEST P.A. LATERAL        No evidence of active chest disease.     04/10/2017      :  2D ECHOCARDIOGRAM AD   04/11/2017  Summary   In the presence of a cardiac arrhythmia left ventricle systolic function   appears preserved. Estimated left ventricular ejection fraction is >60 %.   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   Unable to rule out Right ventricular aneurysm. Suggest Cardiac MRI.      MEDICATIONS  (STANDING):  sertraline 50milliGRAM(s) Oral daily  amLODIPine   Tablet 10milliGRAM(s) Oral daily  levoFLOXacin IVPB  IV Intermittent   levoFLOXacin IVPB 500milliGRAM(s) IV Intermittent every 24 hours  sodium chloride 0.9%. 1000milliLiter(s) IV Continuous <Continuous>  aspirin  chewable 81milliGRAM(s) Oral daily  enoxaparin Injectable 30milliGRAM(s) SubCutaneous every 24 hours  diltiazem    Tablet 30milliGRAM(s) Oral three times a day    MEDICATIONS  (PRN):  acetaminophen   Tablet 650milliGRAM(s) Oral every 6 hours PRN For Temp greater than 38 C (100.4 F)          Assessment and Plan:   Assessment:  · Assessment		    75 yo F with PMH of HTN, anxiety, depression, p/w cough x 2 weeks    *cough, leukocytosis, possible URI  - pt denies cough today, wbc improved, afebrile  - CXR w/o infiltrate  - resp viral panel neg.   - continue IV levaquin for now   - cultures pending      *UTI +strep  - on levaquin as above  - change abx pending urine cx sensitivities    *tachycardia, multifocal atrial tach  - Cardio, EP f/u appreciated   - monitor on tele   - trops neg  - echo preserved LVEF, ? RV aneurysm- cardiac MRI recommended, will d/w Cardio   - 4/12 hr up to 150s on tele.  will start cardizem now  (pt refuses BB)      *Brief episode of CP  - resolved   -trops neg.   -aspirin  - ekg in am  - Cardio f/u appreciated     *Depression / anxiety  -C/w zoloft (as per pt she started this Monday and is tapering off xanax)    *HTN  -Low salt diet  -C/w amlodipine  -As per patient, Enalapril is being held 2/2 cough    *Alternating constipation / diarrhea  -Possibly IBS?  -Continue to monitor, and outpatient f/u if symptoms improve    *DVT ppx  -SCDs

## 2017-04-13 PROCEDURE — 93010 ELECTROCARDIOGRAM REPORT: CPT

## 2017-04-13 RX ORDER — ALPRAZOLAM 0.25 MG
0.25 TABLET ORAL
Qty: 0 | Refills: 0 | Status: DISCONTINUED | OUTPATIENT
Start: 2017-04-13 | End: 2017-04-15

## 2017-04-13 RX ADMIN — AMLODIPINE BESYLATE 10 MILLIGRAM(S): 2.5 TABLET ORAL at 06:03

## 2017-04-13 RX ADMIN — Medication 0.25 MILLIGRAM(S): at 18:53

## 2017-04-13 RX ADMIN — Medication 81 MILLIGRAM(S): at 13:17

## 2017-04-13 RX ADMIN — ENOXAPARIN SODIUM 30 MILLIGRAM(S): 100 INJECTION SUBCUTANEOUS at 23:16

## 2017-04-13 NOTE — PROGRESS NOTE ADULT - SUBJECTIVE AND OBJECTIVE BOX
PMD: Dr. Ortega    HPI:75 yo F with PMH of HTN, anxiety, depression, p/w cough x 2 weeks. Patient states that she was prescribed zithromax and took it for 3 days. However, she developed diarrhea, so she stopped taking it. After that she became constipated for 3 days, and then developed diarrhea again. Her cough kept getting worse. Today patient started new medication zoloft as per PCP, Dr. Ortega, is attempted to transition patient from xanax to zoloft. Later on she developed chest burning sensation and then had two episodes of vomiting. Presently CP has resolved. Patient got scared and came to ED.  Patient states that he was also told by Dr. Ortega that her heart beat was irregular.   Presently c/o feeling nervous / anxious,  but denies any other medical complaints. Denies dysuria / increased frequency/ hematuria.     4/11- reviewed.  Pt admitted this morning.  No events.     4/12-Cough resolved.  Denies chest pain, sob, palpitations.  Denies urinary f/u/d or abd pain. Feeling anxious- states she has longstanding hx anxiety and was just started on zoloft Monday and is supposed to taper off xanax which she hasn't received here.   Tele w/ atrial tach. up to 150s.  Pt states she hasn't tolerated BB in past but was once on cardizem and tolerated well.     4/13- tele w/ atrial tach up to 150s- discussed w/ patient and she denies having chest pain, sob, palp.  States she was only anxious overnight b/c of events w/ her roommate.  Wants to stop zoloft and just take xanax bid.     Vital Signs Last 24 Hrs  T(C): 36.7, Max: 36.8 (04-12 @ 17:43)  T(F): 98, Max: 98.3 (04-12 @ 17:43)  HR: 110 (90 - 114)  BP: 148/88 (146/71 - 163/89)  BP(mean): --  RR: --  SpO2: 97% (97% - 97%)      PHYSICAL EXAM:  General: pleasant, resting comfortably in bed, nad  Neuro: AAOx3, no focal deficits  HEENT: NCAT  Neck: Soft and supple, No JVD  Respiratory: CTA b/l, no w/r/r  Cardiovascular: S1 and S2, tachy, no m/g/r  Gastrointestinal: +BS, soft, NTND, no rebound/guarding  Extremities: No c/c/e  Vascular: 2+ peripheral pulses  Musculoskeletal: 5/5 strength b/l UE and LE, sensation intact  Skin: No rashes        LABS: All Labs Reviewed:                        14.4   10.3  )-----------( 264      ( 12 Apr 2017 06:02 )             43.4     04-12    137  |  102  |  11  ----------------------------<  125<H>  3.7   |  27  |  0.67    Ca    8.8      12 Apr 2017 06:02      Culture - Blood (04.11.17 @ 07:44)    Specimen Source: .Blood None    Culture Results:   No growth to date.    Culture - Urine (04.10.17 @ 21:38)    Specimen Source: .Urine Clean Catch (Midstream)    Culture Results:   >100,000 CFU/ml Streptococcus agalactiae (Group B)                            14.4   10.3  )-----------( 264      ( 12 Apr 2017 06:02 )             43.4     04-12    137  |  102  |  11  ----------------------------<  125<H>  3.7   |  27  |  0.67    Ca    8.8      12 Apr 2017 06:02  Phos  3.3     04-11  Mg     2.4     04-11    TPro  7.6  /  Alb  3.5  /  TBili  1.2  /  DBili  x   /  AST  26  /  ALT  25  /  AlkPhos  73  04-11    PT/INR - ( 11 Apr 2017 07:44 )   PT: 13.1 sec;   INR: 1.21 ratio           CARDIAC MARKERS ( 11 Apr 2017 12:18 )  x     / x     / 196 U/L / x     / x      CARDIAC MARKERS ( 11 Apr 2017 07:44 )  <0.015 ng/mL / x     / 133 U/L / x     / x      CARDIAC MARKERS ( 10 Apr 2017 21:38 )  <0.015 ng/mL / x     / 117 U/L / x     / x            CHEST P.A. LATERAL        No evidence of active chest disease.     04/10/2017      :  2D ECHOCARDIOGRAM AD   04/11/2017  Summary   In the presence of a cardiac arrhythmia left ventricle systolic function   appears preserved. Estimated left ventricular ejection fraction is >60 %.   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   Unable to rule out Right ventricular aneurysm. Suggest Cardiac MRI.    MEDICATIONS  (STANDING):  sertraline 50milliGRAM(s) Oral daily  amLODIPine   Tablet 10milliGRAM(s) Oral daily  levoFLOXacin IVPB  IV Intermittent   levoFLOXacin IVPB 500milliGRAM(s) IV Intermittent every 24 hours  sodium chloride 0.9%. 1000milliLiter(s) IV Continuous <Continuous>  aspirin  chewable 81milliGRAM(s) Oral daily  enoxaparin Injectable 30milliGRAM(s) SubCutaneous every 24 hours  diltiazem    Tablet 60milliGRAM(s) Oral two times a day  diltiazem Injectable 10milliGRAM(s) IV Push once    MEDICATIONS  (PRN):  acetaminophen   Tablet 650milliGRAM(s) Oral every 6 hours PRN For Temp greater than 38 C (100.4 F)  ALPRAZolam 0.25milliGRAM(s) Oral daily PRN anxiety            Assessment and Plan:   Assessment:  · Assessment		    75 yo F with PMH of HTN, anxiety, depression, p/w cough x 2 weeks    *cough, leukocytosis, possible URI  - pt denies cough today, wbc improved, afebrile  - CXR w/o infiltrate  - resp viral panel neg.   - d/c IV levaquin      *UTI +strep  - asymptomatic  - change abx pending urine cx sensitivities  - was on IV levaquin for URI- change to po abx  - blood cx ngtd     *tachycardia, multifocal atrial tach  - Cardio, EP f/u appreciated   - trops neg, no cp or palp   - echo preserved LVEF, ? RV aneurysm- cardiac MRI recommended, will d/w Cardio --> 4/13 Cardio will follow up outpatient for MRI  - 4/12 hr up to 150s on tele.  will start cardizem now  (pt refuses BB)    - 4/13- still tachy on tele,  give cardizem IV x 1 and increase cardizem po dose      *Depression / anxiety  -C/w zoloft (as per pt she started this Monday and is tapering off xanax)  4/13- refusing zoloft, wants home dose xanax bid    *HTN  -Low salt diet  -As per patient, Enalapril is being held 2/2 cough  - continue norvasc; cardizem started     *DVT ppx  -lovenox sc

## 2017-04-14 RX ADMIN — Medication 81 MILLIGRAM(S): at 13:52

## 2017-04-14 RX ADMIN — AMLODIPINE BESYLATE 10 MILLIGRAM(S): 2.5 TABLET ORAL at 05:39

## 2017-04-14 RX ADMIN — ENOXAPARIN SODIUM 30 MILLIGRAM(S): 100 INJECTION SUBCUTANEOUS at 23:04

## 2017-04-14 RX ADMIN — Medication 0.25 MILLIGRAM(S): at 22:23

## 2017-04-14 RX ADMIN — Medication 0.25 MILLIGRAM(S): at 13:54

## 2017-04-14 NOTE — PROGRESS NOTE ADULT - ASSESSMENT
76F p/w SOB, cough, tachycardia.     1. Tachycardia- MAT, STach, SR with prolonged NY, APCs. Elevated HR likely physiologic in setting of PNA, anxiety. EP following. Cont cardizem for now. HR  improved. 2Decho with Nl LV fxn.     2. Abnormal echo findings- IVAN vs cardiac MRI when condition improved. Can arrange as outpt. Pt has no active CP or signs of   decompensation.     3.SOB- likely from URI/PNA. Mgmt as per primary team.     4. HTN- mildly elevated. May need to titrate regimen if remains high. Cont to follow.      5. Outpt cardiac w/u with me upon discharge.     6. DVT proph.
75 yo F with PMH of HTN, anxiety, depression, p/w cough x 2 weeks    *cough, leukocytosis, possible URI  - pt denies cough today, wbc improved, afebrile  - CXR w/o infiltrate  - resp viral panel neg.   - off levaquin      * Asymptomatic bacteriuria with skin contaminant; dc levaquin     * Tachycardia, multifocal atrial tach  - echo preserved LVEF, ? RV aneurysm- cardiac MRI recommended,  Cardio will follow up outpatient for MRI  - 4/12 hr up to 150s on tele.   started on cardizem- pt intolerant of BB   -  still tachy on tele,  give cardizem IV x 1 and s/p increase cardizem po dose      *Depression / anxiety  -C/w zoloft (as per pt she started this Monday and is tapering off xanax)  4/13- refusing zoloft, wants home dose xanax bid    *HTN  -Low salt diet  -As per patient, Enalapril is being held 2/2 cough  - continue norvasc; cardizem started     *DVT ppx  -lovenox sc

## 2017-04-14 NOTE — PROGRESS NOTE ADULT - SUBJECTIVE AND OBJECTIVE BOX
PMD: Dr. Ortega    75 yo F with PMH of HTN, anxiety, depression, p/w cough x 2 weeks. Patient states that she was prescribed zithromax and took it for 3 days. However, she developed diarrhea, so she stopped taking it. After that she became constipated for 3 days, and then developed diarrhea again. Her cough kept getting worse. After she was started new medication zoloft as per PCP, Dr. Ortega, in an  attempt to transition patient from xanax to zoloft she developed chest burning sensation and then had two episodes of vomiting.  Adm for bronchitis and tachycardia    On 4/13- tele w/ atrial tach up to 150s. As of today rate is better controlled but pt remains v anxious, describes v vivid her emotions, she is v anxious about the tachycardia and her bronchitis.    Vital Signs Last 24 Hrs  T(C): 36.7, Max: 36.7 (04-13 @ 10:36)  T(F): 98, Max: 98 (04-13 @ 10:36)  HR: 97 (97 - 126)  BP: 155/76 (148/88 - 160/90)  BP(mean): --  RR: 18 (18 - 18)  SpO2: 98% (96% - 98%)    PHYSICAL EXAM:  General: pleasant, resting comfortably in bed, nad  Neuro: AAOx3, no focal deficits  HEENT: NCAT  Neck: Soft and supple, No JVD  Respiratory: CTA b/l, no w/r/r  Cardiovascular: S1 and S2, tachy, no m/g/r  Gastrointestinal: +BS, soft, NTND, no rebound/guarding  Extremities: No c/c/e  Vascular: 2+ peripheral pulses  Musculoskeletal: 5/5 strength b/l UE and LE, sensation intact  Skin: No rashes        LABS: All Labs Reviewed:                        14.4   10.3  )-----------( 264      ( 12 Apr 2017 06:02 )             43.4     04-12    137  |  102  |  11  ----------------------------<  125<H>  3.7   |  27  |  0.67    Ca    8.8      12 Apr 2017 06:02      Culture - Blood (04.11.17 @ 07:44)    Specimen Source: .Blood None    Culture Results:   No growth to date.    Culture - Urine (04.10.17 @ 21:38)    Specimen Source: .Urine Clean Catch (Midstream)    Culture Results:   >100,000 CFU/ml Streptococcus agalactiae (Group B)                            14.4   10.3  )-----------( 264      ( 12 Apr 2017 06:02 )             43.4     04-12    137  |  102  |  11  ----------------------------<  125<H>  3.7   |  27  |  0.67    Ca    8.8      12 Apr 2017 06:02  Phos  3.3     04-11  Mg     2.4     04-11    TPro  7.6  /  Alb  3.5  /  TBili  1.2  /  DBili  x   /  AST  26  /  ALT  25  /  AlkPhos  73  04-11    PT/INR - ( 11 Apr 2017 07:44 )   PT: 13.1 sec;   INR: 1.21 ratio           CARDIAC MARKERS ( 11 Apr 2017 12:18 )  x     / x     / 196 U/L / x     / x      CARDIAC MARKERS ( 11 Apr 2017 07:44 )  <0.015 ng/mL / x     / 133 U/L / x     / x      CARDIAC MARKERS ( 10 Apr 2017 21:38 )  <0.015 ng/mL / x     / 117 U/L / x     / x            CHEST P.A. LATERAL        No evidence of active chest disease.     04/10/2017      :  2D ECHOCARDIOGRAM AD   04/11/2017  Summary   In the presence of a cardiac arrhythmia left ventricle systolic function   appears preserved. Estimated left ventricular ejection fraction is >60 %.   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   Unable to rule out Right ventricular aneurysm. Suggest Cardiac MRI.    MEDICATIONS  (STANDING):  sertraline 50milliGRAM(s) Oral daily  amLODIPine   Tablet 10milliGRAM(s) Oral daily  levoFLOXacin IVPB  IV Intermittent   levoFLOXacin IVPB 500milliGRAM(s) IV Intermittent every 24 hours  sodium chloride 0.9%. 1000milliLiter(s) IV Continuous <Continuous>  aspirin  chewable 81milliGRAM(s) Oral daily  enoxaparin Injectable 30milliGRAM(s) SubCutaneous every 24 hours  diltiazem    Tablet 60milliGRAM(s) Oral two times a day  diltiazem Injectable 10milliGRAM(s) IV Push once    MEDICATIONS  (PRN):  acetaminophen   Tablet 650milliGRAM(s) Oral every 6 hours PRN For Temp greater than 38 C (100.4 F)  ALPRAZolam 0.25milliGRAM(s) Oral daily PRN anxiety            Assessment and Plan:   Assessment:  · Assessment		    75 yo F with PMH of HTN, anxiety, depression, p/w cough x 2 weeks    *cough, leukocytosis, possible URI  - pt denies cough today, wbc improved, afebrile  - CXR w/o infiltrate  - resp viral panel neg.   - d/c IV levaquin      *UTI +strep  - asymptomatic  - change abx pending urine cx sensitivities  - was on IV levaquin for URI- change to po abx  - blood cx ngtd     *tachycardia, multifocal atrial tach  - Cardio, EP f/u appreciated   - trops neg, no cp or palp   - echo preserved LVEF, ? RV aneurysm- cardiac MRI recommended, will d/w Cardio --> 4/13 Cardio will follow up outpatient for MRI  - 4/12 hr up to 150s on tele.  will start cardizem now  (pt refuses BB)    - 4/13- still tachy on tele,  give cardizem IV x 1 and increase cardizem po dose      *Depression / anxiety  -C/w zoloft (as per pt she started this Monday and is tapering off xanax)  4/13- refusing zoloft, wants home dose xanax bid    *HTN  -Low salt diet  -As per patient, Enalapril is being held 2/2 cough  - continue norvasc; cardizem started     *DVT ppx  -lovenox sc

## 2017-04-14 NOTE — PROGRESS NOTE ADULT - SUBJECTIVE AND OBJECTIVE BOX
Cardiology Consultation    HPI:  75 yo F with PMH of HTN, anxiety, depression, p/w cough x 2 weeks. Patient states that she was prescribed zithromax and took it for 3 days. However, she developed diarrhea, so she stopped taking it. After that she became constipated for 3 days, and then developed diarrhea again. Her cough kept getting worse. Today patient started new medication zoloft as per PCP, Dr. Ortega, is attempted to transition patient from xanax to zoloft. Later on she developed chest burning sensation and then had two episodes of vomiting. Presently CP has resolved. Patient got scared and came to ED.   Patient states that he was also told by Dr. Ortega that her heart beat was irregular. Presently c/o feeling nervous / anxious, but denies any other medical complaints. Denies dysuria / increased frequency/ hematuria.     4/14- Anxious, feeling better. HR better controlled on cardizem. SR on tele. No CP.    PAST MEDICAL & SURGICAL HISTORY:  HTN (hypertension)  Anxiety    Allergies  Inderal LA (Unknown)  penicillins (Unknown)    SOCIAL HISTORY: Denies tobacco, etoh abuse or illicit drug use    FAMILY HISTORY: Noncontributory    MEDICATIONS  (STANDING):  sertraline 50milliGRAM(s) Oral daily  amLODIPine   Tablet 10milliGRAM(s) Oral daily  sodium chloride 0.9%. 1000milliLiter(s) IV Continuous <Continuous>  aspirin  chewable 81milliGRAM(s) Oral daily  enoxaparin Injectable 30milliGRAM(s) SubCutaneous every 24 hours  diltiazem    Tablet 60milliGRAM(s) Oral two times a day  ALPRAZolam 0.25milliGRAM(s) Oral two times a day    MEDICATIONS  (PRN):  acetaminophen   Tablet 650milliGRAM(s) Oral every 6 hours PRN For Temp greater than 38 C (100.4 F)    Vital Signs Last 24 Hrs  T(C): 36.7, Max: 36.7 (04-14 @ 05:06)  T(F): 98, Max: 98 (04-14 @ 05:06)  HR: 97 (97 - 126)  BP: 155/76 (155/76 - 160/90)  BP(mean): --  RR: 18 (18 - 18)  SpO2: 98% (96% - 98%)    REVIEW OF SYSTEMS:    CONSTITUTIONAL:  As per HPI.  HEENT:  Eyes:  No diplopia or blurred vision. ENT:  No earache, sore throat or runny nose.  CARDIOVASCULAR:  No pressure, squeezing, strangling, tightness, heaviness or aching about the chest, neck, axilla or epigastrium.  RESPIRATORY:  No cough, shortness of breath, PND or orthopnea.  GASTROINTESTINAL:  No nausea, vomiting or diarrhea.  GENITOURINARY:  No dysuria, frequency or urgency.  MUSCULOSKELETAL:  As per HPI.  SKIN:  No change in skin, hair or nails.  NEUROLOGIC:  No paresthesias, fasciculations, seizures or weakness.  PSYCHIATRIC:  No disorder of thought or mood.  ENDOCRINE:  No heat or cold intolerance, polyuria or polydipsia.  HEMATOLOGICAL:  No easy bruising or bleedings.     PHYSICAL EXAMINATION:    GENERAL APPEARANCE:  Pt. is not currently dyspneic, in no distress. Pt. is alert, oriented, and pleasant.  HEENT:  Pupils are normal and react normally. No icterus. Mucous membranes well colored.  NECK:  Supple. No lymphadenopathy. Jugular venous pressure not elevated. Carotids equal.   HEART:   The cardiac impulse has a normal quality. There are no murmurs, rubs or gallops noted  CHEST:  Chest is clear to auscultation. Normal respiratory effort.  ABDOMEN:  Soft and nontender.   EXTREMITIES:  There is no edema.   SKIN:  No rash or significant lesions are noted.    I&O's Summary    I & Os for current day (as of 14 Apr 2017 10:55)  =============================================  IN: 360 ml / OUT: 0 ml / NET: 360 ml    LABS: pending.     EKG: STach with prolonged RI, APCs.     TELEMETRY: SR    CARDIAC TESTS: 2Decho-  In the presence of a cardiac arrhythmia left ventricle systolic function   appears preserved. Estimated left ventricular ejection fraction is >60 %.   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  Unable to rule out Right ventricular aneurysm. Suggest Cardiac MRI.    RADIOLOGY & ADDITIONAL STUDIES: pending    ASSESSMENT & PLAN:

## 2017-04-15 VITALS
SYSTOLIC BLOOD PRESSURE: 138 MMHG | DIASTOLIC BLOOD PRESSURE: 72 MMHG | RESPIRATION RATE: 17 BRPM | HEART RATE: 113 BPM | OXYGEN SATURATION: 98 % | TEMPERATURE: 98 F

## 2017-04-15 RX ORDER — AMLODIPINE BESYLATE 2.5 MG/1
1 TABLET ORAL
Qty: 0 | Refills: 0 | COMMUNITY

## 2017-04-15 RX ORDER — ASPIRIN/CALCIUM CARB/MAGNESIUM 324 MG
1 TABLET ORAL
Qty: 0 | Refills: 0 | DISCHARGE
Start: 2017-04-15

## 2017-04-15 RX ORDER — ALPRAZOLAM 0.25 MG
1 TABLET ORAL
Qty: 0 | Refills: 0 | DISCHARGE
Start: 2017-04-15

## 2017-04-15 RX ORDER — DILTIAZEM HCL 120 MG
1 CAPSULE, EXT RELEASE 24 HR ORAL
Qty: 30 | Refills: 0
Start: 2017-04-15

## 2017-04-15 RX ADMIN — Medication 81 MILLIGRAM(S): at 11:47

## 2017-04-15 RX ADMIN — Medication 0.25 MILLIGRAM(S): at 06:16

## 2017-04-15 RX ADMIN — AMLODIPINE BESYLATE 10 MILLIGRAM(S): 2.5 TABLET ORAL at 06:16

## 2017-04-15 NOTE — DISCHARGE NOTE ADULT - CARE PLAN
Principal Discharge DX:	Arrhythmia  Goal:	resolved  Instructions for follow-up, activity and diet:	f/up with PCP

## 2017-04-15 NOTE — DISCHARGE NOTE ADULT - CARE PROVIDER_API CALL
Perla Ortega), Internal Medicine  [address]  [place] [address]  Phone: [phone]  Fax: [phone]    Adin Castro (), Cardiology; Internal Medicine  [address] [place] [address]  Phone: [phone]  Fax: [phone]

## 2017-04-15 NOTE — DISCHARGE NOTE ADULT - HOSPITAL COURSE
75 yo F with PMH of HTN, anxiety, depression, p/w cough x 2 weeks. Patient states that she was prescribed zithromax and took it for 3 days. However, she developed diarrhea, so she stopped taking it. After that she became constipated for 3 days, and then developed diarrhea again. Her cough kept getting worse. After she was started new medication zoloft as per PCP, Dr. Ortega, in an  attempt to transition patient from xanax to zoloft she developed chest burning sensation and then had two episodes of vomiting.  Adm for bronchitis and tachycardia    On 4/13- tele w/ atrial tach up to 150s. As of today rate is better controlled but pt remains v anxious, describes v vivid her emotions, she is v anxious about the tachycardia and her bronchitis.    No c/o today      PHYSICAL EXAM:  General: pleasant, resting comfortably in bed,   Neuro: AAOx3, no focal deficits  HEENT: NCAT  Neck: Soft and supple, No JVD  Respiratory: CTA b/l, no w/r/r  Cardiovascular: S1 and S2, tachy, no m/g/r  Gastrointestinal: +BS, soft, NTND, no rebound/guarding  Extremities: No c/c/e  Vascular: 2+ peripheral pulses  Musculoskeletal: 5/5 strength b/l UE and LE, sensation intact  Skin: No rashes    LABS: All Labs Reviewed:         · Assessment		    75 yo F with PMH of HTN, anxiety, depression, p/w cough x 2 weeks    *cough, leukocytosis, possible URI  - pt denies cough today, wbc improved, afebrile    *UTI +strep  - asymptomatic; skin contaminant      *tachycardia, multifocal atrial tach  - Cardio fup as outpt; c/w cardizem    *Depression / anxiety  -C/w zoloft (as per pt she started this Monday and is tapering off xanax)  4/13- refusing zoloft, wants home dose xanax bid    *HTN  -Low salt diet  -As per patient off Enalapril 2/2 cough   cardizem started     Time spent 40 min

## 2017-04-15 NOTE — DISCHARGE NOTE ADULT - PATIENT PORTAL LINK FT
“You can access the Follow[facility] Patient Portal, offered by [facility], by registering with the following website: [email or web address]”

## 2017-04-15 NOTE — DISCHARGE NOTE ADULT - MEDICATION SUMMARY - MEDICATIONS TO TAKE
I will START or STAY ON the medications listed below when I get home from the hospital:    aspirin 81 mg oral tablet, chewable  -- 1 tab(s) by mouth once a day  -- Indication: For .    diltiaZEM 120 mg oral tablet  -- 1 tab(s) by mouth once a day  -- Indication: For .    Zoloft 50 mg oral tablet  -- 1 tab(s) by mouth once a day  -- Indication: For .    ALPRAZolam 0.25 mg oral tablet  -- 1 tab(s) by mouth 2 times a day  -- Indication: For .

## 2017-04-15 NOTE — DISCHARGE NOTE ADULT - MEDICATION SUMMARY - MEDICATIONS TO STOP TAKING
I will STOP taking the medications listed below when I get home from the hospital:    amLODIPine 10 mg oral tablet  -- 1 tab(s) by mouth once a day

## 2017-04-16 LAB
CULTURE RESULTS: SIGNIFICANT CHANGE UP
SPECIMEN SOURCE: SIGNIFICANT CHANGE UP

## 2017-04-19 DIAGNOSIS — R19.7 DIARRHEA, UNSPECIFIED: ICD-10-CM

## 2017-04-19 DIAGNOSIS — K59.00 CONSTIPATION, UNSPECIFIED: ICD-10-CM

## 2017-04-19 DIAGNOSIS — R07.9 CHEST PAIN, UNSPECIFIED: ICD-10-CM

## 2017-04-19 DIAGNOSIS — F41.9 ANXIETY DISORDER, UNSPECIFIED: ICD-10-CM

## 2017-04-19 DIAGNOSIS — I47.1 SUPRAVENTRICULAR TACHYCARDIA: ICD-10-CM

## 2017-04-19 DIAGNOSIS — J06.9 ACUTE UPPER RESPIRATORY INFECTION, UNSPECIFIED: ICD-10-CM

## 2017-04-19 DIAGNOSIS — F32.9 MAJOR DEPRESSIVE DISORDER, SINGLE EPISODE, UNSPECIFIED: ICD-10-CM

## 2017-04-19 DIAGNOSIS — R05 COUGH: ICD-10-CM

## 2017-04-19 DIAGNOSIS — I10 ESSENTIAL (PRIMARY) HYPERTENSION: ICD-10-CM

## 2017-04-19 DIAGNOSIS — I44.1 ATRIOVENTRICULAR BLOCK, SECOND DEGREE: ICD-10-CM

## 2017-04-19 DIAGNOSIS — D72.829 ELEVATED WHITE BLOOD CELL COUNT, UNSPECIFIED: ICD-10-CM

## 2018-03-31 ENCOUNTER — EMERGENCY (EMERGENCY)
Facility: HOSPITAL | Age: 78
LOS: 0 days | Discharge: HOME | End: 2018-03-31
Attending: EMERGENCY MEDICINE | Admitting: EMERGENCY MEDICINE
Payer: MEDICARE

## 2018-03-31 VITALS — WEIGHT: 207.9 LBS

## 2018-03-31 VITALS
RESPIRATION RATE: 18 BRPM | DIASTOLIC BLOOD PRESSURE: 76 MMHG | TEMPERATURE: 98 F | HEART RATE: 101 BPM | SYSTOLIC BLOOD PRESSURE: 167 MMHG | OXYGEN SATURATION: 99 %
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
## 2018-03-31 DIAGNOSIS — S00.83XA CONTUSION OF OTHER PART OF HEAD, INITIAL ENCOUNTER: ICD-10-CM
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
## 2018-03-31 DIAGNOSIS — S89.80XA OTHER SPECIFIED INJURIES OF UNSPECIFIED LOWER LEG, INITIAL ENCOUNTER: ICD-10-CM

## 2018-03-31 DIAGNOSIS — S09.90XA UNSPECIFIED INJURY OF HEAD, INITIAL ENCOUNTER: ICD-10-CM

## 2018-03-31 PROBLEM — I10 ESSENTIAL (PRIMARY) HYPERTENSION: Chronic | Status: ACTIVE | Noted: 2017-04-11

## 2018-03-31 PROBLEM — F41.9 ANXIETY DISORDER, UNSPECIFIED: Chronic | Status: ACTIVE | Noted: 2017-04-11

## 2018-03-31 PROCEDURE — 70450 CT HEAD/BRAIN W/O DYE: CPT | Mod: 26

## 2018-03-31 PROCEDURE — 72125 CT NECK SPINE W/O DYE: CPT | Mod: 26

## 2018-03-31 PROCEDURE — 70486 CT MAXILLOFACIAL W/O DYE: CPT | Mod: 26

## 2018-03-31 PROCEDURE — 76377 3D RENDER W/INTRP POSTPROCES: CPT | Mod: 26

## 2018-03-31 PROCEDURE — 72190 X-RAY EXAM OF PELVIS: CPT | Mod: 26

## 2018-03-31 PROCEDURE — 93010 ELECTROCARDIOGRAM REPORT: CPT

## 2018-03-31 PROCEDURE — 73562 X-RAY EXAM OF KNEE 3: CPT | Mod: 26,RT

## 2018-03-31 PROCEDURE — 99284 EMERGENCY DEPT VISIT MOD MDM: CPT

## 2018-03-31 PROCEDURE — 71046 X-RAY EXAM CHEST 2 VIEWS: CPT | Mod: 26

## 2018-03-31 NOTE — ED STATDOCS - ENMT, MLM
Nasal mucosa clear.  Mouth with normal mucosa  Throat has no vesicles, no oropharyngeal exudates and uvula is midline. Nasal mucosa clear.  Mouth with normal mucosa. Bruising to R nasal bridge

## 2018-03-31 NOTE — ED STATDOCS - MUSCULOSKELETAL, MLM
range of motion is not limited and there is no muscle tenderness. range of motion is not limited and there is no muscle tenderness. spine is normal, no tenderness

## 2018-03-31 NOTE — ED ADULT NURSE NOTE - CHPI ED SYMPTOMS NEG
no numbness/no abrasion/no confusion/no vomiting/no fever/no bleeding/no weakness/no tingling/no deformity/no loss of consciousness
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
## 2018-03-31 NOTE — ED STATDOCS - PROGRESS NOTE DETAILS
Pt declining pain meds at this time. DARIO Maldonado:   Patient has been seen, evaluated and orders have been written by the attending in intake. Patient is stable.  I will follow up the results of orders written and I will continue to evaluate/observe the patient.  Glenda Maldonado PA-C Negative xrays.  Pt. remains stable.  Will continue to monitor.  Glenda Maldonado PA-C Reevaluated patient at bedside.  Patient feeling much improved.  Discussed the results of all diagnostic testing in ED and copies of all reports given.   An opportunity to ask questions was given.  Discussed the importance of prompt, close medical follow-up.  Patient will return with any changes, concerns or persistent / worsening symptoms.  Understanding of all instructions verbalized.  Glenda Maldonado PA-C

## 2018-03-31 NOTE — ED ADULT TRIAGE NOTE - CHIEF COMPLAINT QUOTE
Pt tripped at [facility] and fell, head hit on grass.  No LOC.  No anticoagulants.

## 2018-03-31 NOTE — ED STATDOCS - ATTENDING CONTRIBUTION TO CARE
I, Dorothea Copeland MD, personally saw the patient with ACP.  I have personally performed a face to face diagnostic evaluation on this patient.  I have reviewed the ACP note and agree with the history, exam, and plan of care, except as noted.

## 2018-03-31 NOTE — ED ADULT NURSE NOTE - OBJECTIVE STATEMENT
pt states she tripped and fell out of car while at . hit head on ground but denies LOC. c/o posterior head pain and nasal pain.

## 2019-05-16 NOTE — ED ADULT NURSE NOTE - CAS TRG GEN SKIN CONDITION
Dry/Warm Cartilage Graft Text: The defect edges were debeveled with a #15 scalpel blade.  Given the location of the defect, shape of the defect, the fact the defect involved a full thickness cartilage defect a cartilage graft was deemed most appropriate.  An appropriate donor site was identified, cleansed, and anesthetized. The cartilage graft was then harvested and transferred to the recipient site, oriented appropriately and then sutured into place.  The secondary defect was then repaired using a primary closure.

## 2020-01-06 ENCOUNTER — OUTPATIENT (OUTPATIENT)
Dept: OUTPATIENT SERVICES | Facility: HOSPITAL | Age: 80
LOS: 1 days | End: 2020-01-06
Payer: COMMERCIAL

## 2020-01-06 ENCOUNTER — TRANSCRIPTION ENCOUNTER (OUTPATIENT)
Age: 80
End: 2020-01-06

## 2020-01-06 ENCOUNTER — APPOINTMENT (OUTPATIENT)
Dept: ULTRASOUND IMAGING | Facility: CLINIC | Age: 80
End: 2020-01-06
Payer: MEDICARE

## 2020-01-06 DIAGNOSIS — M79.604 PAIN IN RIGHT LEG: ICD-10-CM

## 2020-01-06 PROCEDURE — 93971 EXTREMITY STUDY: CPT | Mod: 26,RT

## 2020-01-06 PROCEDURE — 93971 EXTREMITY STUDY: CPT

## 2020-07-21 PROBLEM — Z00.00 ENCOUNTER FOR PREVENTIVE HEALTH EXAMINATION: Status: ACTIVE | Noted: 2020-07-21

## 2020-07-23 ENCOUNTER — APPOINTMENT (OUTPATIENT)
Dept: NEUROLOGY | Facility: CLINIC | Age: 80
End: 2020-07-23
Payer: MEDICARE

## 2020-07-23 VITALS
HEIGHT: 65 IN | DIASTOLIC BLOOD PRESSURE: 93 MMHG | WEIGHT: 206 LBS | BODY MASS INDEX: 34.32 KG/M2 | HEART RATE: 99 BPM | SYSTOLIC BLOOD PRESSURE: 194 MMHG

## 2020-07-23 DIAGNOSIS — Z78.9 OTHER SPECIFIED HEALTH STATUS: ICD-10-CM

## 2020-07-23 DIAGNOSIS — G57.12 MERALGIA PARESTHETICA, LEFT LOWER LIMB: ICD-10-CM

## 2020-07-23 DIAGNOSIS — Z82.0 FAMILY HISTORY OF EPILEPSY AND OTHER DISEASES OF THE NERVOUS SYSTEM: ICD-10-CM

## 2020-07-23 DIAGNOSIS — I10 ESSENTIAL (PRIMARY) HYPERTENSION: ICD-10-CM

## 2020-07-23 DIAGNOSIS — Z80.7 FAMILY HISTORY OF OTHER MALIGNANT NEOPLASMS OF LYMPHOID, HEMATOPOIETIC AND RELATED TISSUES: ICD-10-CM

## 2020-07-23 DIAGNOSIS — R20.0 ANESTHESIA OF SKIN: ICD-10-CM

## 2020-07-23 DIAGNOSIS — G62.9 POLYNEUROPATHY, UNSPECIFIED: ICD-10-CM

## 2020-07-23 PROCEDURE — 99204 OFFICE O/P NEW MOD 45 MIN: CPT

## 2020-07-23 RX ORDER — LIDOCAINE 5% 700 MG/1
5 PATCH TOPICAL
Qty: 30 | Refills: 3 | Status: ACTIVE | COMMUNITY
Start: 2020-07-23 | End: 1900-01-01

## 2020-07-23 NOTE — REVIEW OF SYSTEMS
[Loss Of Hearing] : hearing loss [Cough] : cough [As Noted in HPI] : as noted in HPI [Negative] : Genitourinary [FreeTextEntry4] : sinus condition

## 2020-07-23 NOTE — HISTORY OF PRESENT ILLNESS
[FreeTextEntry1] : Ms. Bolivar is here today with her daughter.\par She says that for several years she has had numbness in her toes.\par Last week she woke up and her toes were more numb than usual.\par She describes the numbness as loss of sensation.\par She says that the numbness then progressed to include her legs.\par The right leg got better but the numbness in her left leg has persisted.\par She has needed to use a cane to walk. She feels off balance and she has pain in her left thigh and behind her left knee.\par She feels that her legs are weak.\par \par \par She has chronic low back pain.\par She sees a chiropractor with some regularity.\par Her back pain does not currently radiate down the legs but she has had sciatica in the past.\par She denies having any bowel or bladder incontinence.\par

## 2020-07-23 NOTE — CONSULT LETTER
[Dear  ___] : Dear  [unfilled], [Consult Letter:] : I had the pleasure of evaluating your patient, [unfilled]. [Consult Closing:] : Thank you very much for allowing me to participate in the care of this patient.  If you have any questions, please do not hesitate to contact me. [Please see my note below.] : Please see my note below. [FreeTextEntry3] : Sincerely,\par \par \par Fadia Santillan MD\par Diplomate, American Academy of Psychiatry and Neurology\par Board Certified in the Subspecialty of Clinical Neurophysiology\par Board Certified in the Subspecialty of Sleep Medicine\par Board Certified in the Subspecialty of Epilepsy\par  [FreeTextEntry2] : Nico Gao

## 2020-07-23 NOTE — PHYSICAL EXAM
[FreeTextEntry1] : Examination:\par Constitutional: normal, no apparent distress\par Eyes: normal conjunctiva b/l, no ptosis, visual fields full\par Respiratory: no respiratory distress, normal effort, normal auscultation\par Cardiovascular: normal rate, rhythm, no murmurs\par Neck: supple, no masses\par Vascular: carotids normal\par Skin: normal color, no rashes\par Psych: normal mood, affect\par \par Neurological:\par Memory: normal memory, oriented to person, place, time\par Language intact/no aphasia\par Cranial Nerves: II-XII intact, Pupils equally round and reactive to light, ocular muscles/movements intact, no ptosis, no facial weakness, tongue protrudes normally in the midline, \par Motor: normal tone, no pronator drift, full strength in b/l upper extremities.\par Full strength in right lower extremity, 4+/5 in left hip flexion, 5/5 in knee flexion/extension and dorsiflexion/plantar flexion\par Coordination: Fine motor movements intact, rapid alternating movements intact, finger to nose intact bilaterally\par Sensory: Decreased sensation over left anterolateral thigh. Decreased sensation on left lower leg and foot compared to right. Decreased vibration on left compared to right, joint position sense intact\par DTRs: symmetric, trace in b/l triceps, trace in b/l biceps, trace in b/l brachioradialis, 1 in bilateral patellars, absent in bilateral Achilles, Babinskis negative bilaterally\par Gait: antalgic\par \par

## 2020-07-23 NOTE — DISCUSSION/SUMMARY
[FreeTextEntry1] : Ms. Bolivar is an 79 year old woman with hypertension who reports long standing numbness in her feet, with a recent acute exacerbation as well as left leg pain.\par \par She does have evidence of peripheral neuropathy on examination which is likely long standing. \par In addition, she currently has an antalgic gait as well as decreased sensation in the left anterolateral thigh (where the majority of her pain is).\par \par Peripheral neuropathy\par -Will check blood tests to look for underlying causes - HbA1C, TSH, Vitamin B12, SPEP\par -We discussed medications for painful neuropathy such as gabapentin and she would like to hold off at this time.\par \par Left leg pain\par -She may have a left sided meralgia paresthetica from compression of the lateral femoral cutaneous nerve but this does not explain all of her symptoms.\par -Trial of Lidoderm patch to be applied to painful left thigh. She can use this for up to 12 hours per day. If not covered by insurance she can get Salon Pas lidocaine patches over the counter.\par -Avoid tight clothing\par -If symptoms persist, get ultrasound of left leg to r/o DVT since she also has pain in the calf.\par -We also discussed the possibility that symptoms are arising from her lumbar spine. She did have bilateral symptoms, now with improvement on the right side. I suggested MRI lumbar spine. She is very claustrophobic and would like to hold off if possible.\par \par If necessary, nerve conduction studies/EMG, can be done to confirm the diagnosis of peripheral neuropathy and rule out a lumbar radiculopathy.\par \par I will follow up with her after the above tests. She should feel free to call if she changes her mind about medication.

## 2020-07-24 LAB
ALBUMIN MFR SERPL ELPH: 54.2 %
ALBUMIN SERPL-MCNC: 4.3 G/DL
ALBUMIN/GLOB SERPL: 1.2 RATIO
ALPHA1 GLOB MFR SERPL ELPH: 3.8 %
ALPHA1 GLOB SERPL ELPH-MCNC: 0.3 G/DL
ALPHA2 GLOB MFR SERPL ELPH: 12.5 %
ALPHA2 GLOB SERPL ELPH-MCNC: 1 G/DL
B-GLOBULIN MFR SERPL ELPH: 12.3 %
B-GLOBULIN SERPL ELPH-MCNC: 1 G/DL
GAMMA GLOB FLD ELPH-MCNC: 1.4 G/DL
GAMMA GLOB MFR SERPL ELPH: 17.2 %
INTERPRETATION SERPL IEP-IMP: NORMAL
PROT SERPL-MCNC: 7.9 G/DL
PROT SERPL-MCNC: 7.9 G/DL
TSH SERPL-ACNC: 2.47 UIU/ML
VIT B12 SERPL-MCNC: 269 PG/ML

## 2020-08-10 ENCOUNTER — OUTPATIENT (OUTPATIENT)
Dept: OUTPATIENT SERVICES | Facility: HOSPITAL | Age: 80
LOS: 1 days | End: 2020-08-10
Payer: COMMERCIAL

## 2020-08-10 ENCOUNTER — APPOINTMENT (OUTPATIENT)
Dept: ULTRASOUND IMAGING | Facility: CLINIC | Age: 80
End: 2020-08-10
Payer: MEDICARE

## 2020-08-10 DIAGNOSIS — M54.10 RADICULOPATHY, SITE UNSPECIFIED: ICD-10-CM

## 2020-08-10 PROCEDURE — 93971 EXTREMITY STUDY: CPT

## 2020-08-10 PROCEDURE — 93971 EXTREMITY STUDY: CPT | Mod: 26,LT

## 2020-08-17 DIAGNOSIS — M54.10 RADICULOPATHY, SITE UNSPECIFIED: ICD-10-CM

## 2020-08-17 RX ORDER — GABAPENTIN 100 MG/1
100 CAPSULE ORAL
Qty: 60 | Refills: 2 | Status: ACTIVE | COMMUNITY
Start: 2020-08-17 | End: 1900-01-01

## 2021-01-15 ENCOUNTER — APPOINTMENT (OUTPATIENT)
Dept: ULTRASOUND IMAGING | Facility: CLINIC | Age: 81
End: 2021-01-15
Payer: MEDICARE

## 2021-01-15 ENCOUNTER — OUTPATIENT (OUTPATIENT)
Dept: OUTPATIENT SERVICES | Facility: HOSPITAL | Age: 81
LOS: 1 days | End: 2021-01-15
Payer: COMMERCIAL

## 2021-01-15 DIAGNOSIS — Z00.8 ENCOUNTER FOR OTHER GENERAL EXAMINATION: ICD-10-CM

## 2021-01-15 DIAGNOSIS — M79.669 PAIN IN UNSPECIFIED LOWER LEG: ICD-10-CM

## 2021-01-15 PROCEDURE — 93970 EXTREMITY STUDY: CPT | Mod: 26

## 2021-01-15 PROCEDURE — 93970 EXTREMITY STUDY: CPT

## 2021-01-15 PROCEDURE — 93925 LOWER EXTREMITY STUDY: CPT

## 2021-01-15 PROCEDURE — 93925 LOWER EXTREMITY STUDY: CPT | Mod: 26

## 2022-01-07 NOTE — PATIENT PROFILE ADULT. - BRADEN SCORE (IF 18 OR LESS ACTIVATE SKIN INJURY RISK INCREASED GUIDELINE), MLM
Child is presenting with clinical presentation of periorbital cellulitis  She will be started on Omnicef 14  Mg per kg per day amounting to 5 mL once a day  Mom will give this to her daughter once a day for  10 days  Mom will give her daughter yogurt for the probiotic effect and she will call us back with any concern especially if the swelling of her eyelid is not improving or if child develops any discomfort  Currently this child does not seem to have any discomfort nor itching of the eye  23

## 2022-03-02 ENCOUNTER — INPATIENT (INPATIENT)
Facility: HOSPITAL | Age: 82
LOS: 1 days | Discharge: HOME | DRG: 310 | End: 2022-03-04
Attending: STUDENT IN AN ORGANIZED HEALTH CARE EDUCATION/TRAINING PROGRAM | Admitting: FAMILY MEDICINE
Payer: MEDICARE

## 2022-03-02 VITALS — OXYGEN SATURATION: 98 % | RESPIRATION RATE: 20 BRPM | WEIGHT: 205.03 LBS | HEIGHT: 67 IN | HEART RATE: 101 BPM

## 2022-03-02 DIAGNOSIS — R07.9 CHEST PAIN, UNSPECIFIED: ICD-10-CM

## 2022-03-02 LAB
APPEARANCE UR: CLEAR — SIGNIFICANT CHANGE UP
APTT BLD: 35 SEC — SIGNIFICANT CHANGE UP (ref 27.5–35.5)
BASOPHILS # BLD AUTO: 0.05 K/UL — SIGNIFICANT CHANGE UP (ref 0–0.2)
BASOPHILS NFR BLD AUTO: 0.6 % — SIGNIFICANT CHANGE UP (ref 0–2)
BILIRUB UR-MCNC: NEGATIVE — SIGNIFICANT CHANGE UP
COLOR SPEC: YELLOW — SIGNIFICANT CHANGE UP
DIFF PNL FLD: ABNORMAL
EOSINOPHIL # BLD AUTO: 0.16 K/UL — SIGNIFICANT CHANGE UP (ref 0–0.5)
EOSINOPHIL NFR BLD AUTO: 1.8 % — SIGNIFICANT CHANGE UP (ref 0–6)
GLUCOSE UR QL: NEGATIVE — SIGNIFICANT CHANGE UP
HCT VFR BLD CALC: 48 % — HIGH (ref 34.5–45)
HGB BLD-MCNC: 15.8 G/DL — HIGH (ref 11.5–15.5)
IMM GRANULOCYTES NFR BLD AUTO: 0.3 % — SIGNIFICANT CHANGE UP (ref 0–1.5)
INR BLD: 1.15 RATIO — SIGNIFICANT CHANGE UP (ref 0.88–1.16)
KETONES UR-MCNC: NEGATIVE — SIGNIFICANT CHANGE UP
LEUKOCYTE ESTERASE UR-ACNC: ABNORMAL
LYMPHOCYTES # BLD AUTO: 1.59 K/UL — SIGNIFICANT CHANGE UP (ref 1–3.3)
LYMPHOCYTES # BLD AUTO: 17.5 % — SIGNIFICANT CHANGE UP (ref 13–44)
MCHC RBC-ENTMCNC: 27.2 PG — SIGNIFICANT CHANGE UP (ref 27–34)
MCHC RBC-ENTMCNC: 32.9 GM/DL — SIGNIFICANT CHANGE UP (ref 32–36)
MCV RBC AUTO: 82.6 FL — SIGNIFICANT CHANGE UP (ref 80–100)
MONOCYTES # BLD AUTO: 0.83 K/UL — SIGNIFICANT CHANGE UP (ref 0–0.9)
MONOCYTES NFR BLD AUTO: 9.1 % — SIGNIFICANT CHANGE UP (ref 2–14)
NEUTROPHILS # BLD AUTO: 6.43 K/UL — SIGNIFICANT CHANGE UP (ref 1.8–7.4)
NEUTROPHILS NFR BLD AUTO: 70.7 % — SIGNIFICANT CHANGE UP (ref 43–77)
NITRITE UR-MCNC: NEGATIVE — SIGNIFICANT CHANGE UP
PH UR: 7 — SIGNIFICANT CHANGE UP (ref 5–8)
PLATELET # BLD AUTO: 290 K/UL — SIGNIFICANT CHANGE UP (ref 150–400)
PROT UR-MCNC: 30 MG/DL
PROTHROM AB SERPL-ACNC: 13.4 SEC — SIGNIFICANT CHANGE UP (ref 10.5–13.4)
RBC # BLD: 5.81 M/UL — HIGH (ref 3.8–5.2)
RBC # FLD: 14.6 % — HIGH (ref 10.3–14.5)
SP GR SPEC: 1.01 — SIGNIFICANT CHANGE UP (ref 1.01–1.02)
TROPONIN I, HIGH SENSITIVITY RESULT: 28.24 NG/L — SIGNIFICANT CHANGE UP
UROBILINOGEN FLD QL: NEGATIVE — SIGNIFICANT CHANGE UP
WBC # BLD: 9.09 K/UL — SIGNIFICANT CHANGE UP (ref 3.8–10.5)
WBC # FLD AUTO: 9.09 K/UL — SIGNIFICANT CHANGE UP (ref 3.8–10.5)

## 2022-03-02 PROCEDURE — 99285 EMERGENCY DEPT VISIT HI MDM: CPT

## 2022-03-02 PROCEDURE — 80053 COMPREHEN METABOLIC PANEL: CPT

## 2022-03-02 PROCEDURE — 36415 COLL VENOUS BLD VENIPUNCTURE: CPT

## 2022-03-02 PROCEDURE — 93306 TTE W/DOPPLER COMPLETE: CPT

## 2022-03-02 PROCEDURE — 99223 1ST HOSP IP/OBS HIGH 75: CPT

## 2022-03-02 PROCEDURE — 84484 ASSAY OF TROPONIN QUANT: CPT

## 2022-03-02 PROCEDURE — 85025 COMPLETE CBC W/AUTO DIFF WBC: CPT

## 2022-03-02 PROCEDURE — 71045 X-RAY EXAM CHEST 1 VIEW: CPT | Mod: 26

## 2022-03-02 RX ORDER — SERTRALINE 25 MG/1
1 TABLET, FILM COATED ORAL
Qty: 0 | Refills: 0 | DISCHARGE

## 2022-03-02 RX ORDER — ASPIRIN/CALCIUM CARB/MAGNESIUM 324 MG
81 TABLET ORAL DAILY
Refills: 0 | Status: DISCONTINUED | OUTPATIENT
Start: 2022-03-02 | End: 2022-03-03

## 2022-03-02 RX ORDER — ALPRAZOLAM 0.25 MG
0.25 TABLET ORAL ONCE
Refills: 0 | Status: DISCONTINUED | OUTPATIENT
Start: 2022-03-02 | End: 2022-03-02

## 2022-03-02 RX ORDER — CHOLECALCIFEROL (VITAMIN D3) 125 MCG
1 CAPSULE ORAL
Qty: 0 | Refills: 0 | DISCHARGE

## 2022-03-02 RX ORDER — LANOLIN ALCOHOL/MO/W.PET/CERES
3 CREAM (GRAM) TOPICAL AT BEDTIME
Refills: 0 | Status: DISCONTINUED | OUTPATIENT
Start: 2022-03-02 | End: 2022-03-04

## 2022-03-02 RX ORDER — DILTIAZEM HCL 120 MG
10 CAPSULE, EXT RELEASE 24 HR ORAL ONCE
Refills: 0 | Status: COMPLETED | OUTPATIENT
Start: 2022-03-02 | End: 2022-03-02

## 2022-03-02 RX ORDER — PREGABALIN 225 MG/1
1 CAPSULE ORAL
Qty: 0 | Refills: 0 | DISCHARGE

## 2022-03-02 RX ORDER — DILTIAZEM HCL 120 MG
180 CAPSULE, EXT RELEASE 24 HR ORAL DAILY
Refills: 0 | Status: DISCONTINUED | OUTPATIENT
Start: 2022-03-02 | End: 2022-03-03

## 2022-03-02 RX ORDER — HYDRALAZINE HCL 50 MG
2.5 TABLET ORAL EVERY 8 HOURS
Refills: 0 | Status: DISCONTINUED | OUTPATIENT
Start: 2022-03-02 | End: 2022-03-04

## 2022-03-02 RX ORDER — ROSUVASTATIN CALCIUM 5 MG/1
1 TABLET ORAL
Qty: 0 | Refills: 0 | DISCHARGE

## 2022-03-02 RX ORDER — CHOLECALCIFEROL (VITAMIN D3) 125 MCG
2000 CAPSULE ORAL DAILY
Refills: 0 | Status: DISCONTINUED | OUTPATIENT
Start: 2022-03-02 | End: 2022-03-04

## 2022-03-02 RX ORDER — PREGABALIN 225 MG/1
1000 CAPSULE ORAL DAILY
Refills: 0 | Status: DISCONTINUED | OUTPATIENT
Start: 2022-03-02 | End: 2022-03-04

## 2022-03-02 RX ORDER — ACETAMINOPHEN 500 MG
650 TABLET ORAL EVERY 6 HOURS
Refills: 0 | Status: DISCONTINUED | OUTPATIENT
Start: 2022-03-02 | End: 2022-03-04

## 2022-03-02 RX ORDER — ONDANSETRON 8 MG/1
4 TABLET, FILM COATED ORAL EVERY 8 HOURS
Refills: 0 | Status: DISCONTINUED | OUTPATIENT
Start: 2022-03-02 | End: 2022-03-04

## 2022-03-02 RX ADMIN — Medication 10 MILLIGRAM(S): at 22:12

## 2022-03-02 RX ADMIN — Medication 2.5 MILLIGRAM(S): at 19:49

## 2022-03-02 RX ADMIN — Medication 0.25 MILLIGRAM(S): at 20:26
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
## 2022-03-02 NOTE — PHARMACOTHERAPY INTERVENTION NOTE - COMMENTS
Medication reconciliation completed.  Reviewed Medication list and confirmed med allergies with patient; confirmed with Dr. Fagan Med[facility].  Pt confirms that she has a prescription for Crestor 5mg 1 tab daily but has not started taking yet.  Pt states that she is nervous about taking new medication due to numerous allergies.

## 2022-03-02 NOTE — ED PROVIDER NOTE - CARE PLAN
Principal Discharge DX:	Pain in the chest  Secondary Diagnosis:	Atrial fibrillation  Secondary Diagnosis:	Near syncope   1

## 2022-03-02 NOTE — H&P ADULT - NSICDXFAMILYHX_GEN_ALL_CORE_FT
FAMILY HISTORY:  Father  Still living? Unknown  FH: Alzheimers disease, Age at diagnosis: Age Unknown    Sibling  Still living? Unknown  Family history of multiple myeloma, Age at diagnosis: Age Unknown

## 2022-03-02 NOTE — H&P ADULT - NSICDXPASTMEDICALHX_GEN_ALL_CORE_FT
PAST MEDICAL HISTORY:  Anxiety     Aortic aneurysm     HTN (hypertension)      PAST MEDICAL HISTORY:  Anxiety     Aortic aneurysm     Chronic lower back pain     HTN (hypertension)     Meralgia paresthetica, left lower limb     Peripheral neuropathy      PAST MEDICAL HISTORY:  Anxiety     Chronic lower back pain     HTN (hypertension)     Meralgia paresthetica, left lower limb     Multifocal atrial tachycardia     Peripheral neuropathy

## 2022-03-02 NOTE — ED PROVIDER NOTE - OBJECTIVE STATEMENT
81 F hx HTN, heart murmur, anxiety, aortic aneurysm here s/p near syncopal episode. pt  recalls today she was heading to a doctor's appointment and states she was very anxious that she would miss the appointment and had a near syncopal episode. No lost of consciousness. no associated symptom of  nausea. endorses +chest pain.   pt had an EKG done at OP doctors office that showed Afib and was told to come to the ED. pt states she has been under a lot of stress lately with the passing of her son.  pt states that she takes diltiazem daily.  former smoker, quit in 90's. no ETOH drinker. no drug user. covid vaccinated x 2. cardiologist: Dr. Gao. 81 F hx HTN, heart murmur, anxiety, aortic aneurysm here s/p near syncopal episode. pt  recalls today she was heading to a doctor's appointment and states she was very anxious that she would miss the appointment and had a near syncopal episode. No lost of consciousness. no associated symptom of  nausea. endorses. states she has chronic pain under her armpit but today it radiated to her chest. pt had an EKG done at OP doctors office that showed Afib and was told to come to the ED. pt states she has been under a lot of stress lately with the passing of her son.  pt states that she takes diltiazem daily.  former smoker, quit in 90's. no ETOH drinker. no drug user. covid vaccinated x 2. cardiologist: Dr. Gao.

## 2022-03-02 NOTE — ED ADULT NURSE NOTE - OBJECTIVE STATEMENT
Pt presents to er with complaints of pain to bilateral armpits and extreme anxiety with near syncopal episode, pt reports she just lost her son one month ago and can't stand how she feels, denies change in vision, sob, chest pain at this time, states she is on Cardizem daily and took her dose this morning, pt observed sitting up in stretcher with unlabored respirations.

## 2022-03-02 NOTE — H&P ADULT - ASSESSMENT
80 y/o F PMHx significant for anxiety, hypertension, multifocal atrial tachycardia, peripheral neuropathy, and meralgia paresthetica of the left lower limb presents to  for further evaluation and management of a near syncopal episode prior to arrival. The patient recall's that today while driving to a doctor's appointment and states she had an acute onset of "feeling very anxious" followed by a near syncopal episode. The patient denies any loss of consciousness. She did describe  prodrome of left sided chest pain which reportedly radiated to her left arm. The patient had an EKG performed at her PCP's office which reportedly revealed "Atrial fibrillation." The patient was subsequently referred to  for further evaluation. Of note the patient admits to several family stressors recently including the recent passing of her son. The patient denies any associated dyspnea, nausea, vomiting, headache, or blurry vision.     82 y/o F PMHx significant for anxiety, hypertension, multifocal atrial tachycardia, peripheral neuropathy, and meralgia paresthetica of the left lower limb presents to  for further evaluation and management of a near syncopal episode prior to arrival. The patient recall's that today while driving to a doctor's appointment and states she had an acute onset of "feeling very anxious" followed by a near syncopal episode. The patient denies any loss of consciousness. She did describe  prodrome of left sided chest pain which reportedly radiated to her left arm. The patient had an EKG performed at her PCP's office which reportedly revealed "Atrial fibrillation." The patient was subsequently referred to  for further evaluation. Of note the patient admits to several family stressors recently including the recent passing of her son. The patient denies any associated dyspnea, nausea, vomiting, headache, or blurry vision.    #Hypertensive Urgency  #Chest Pain  ~admit to Telemetry  ~serial Cori/EKGs  ~patient only on single agent at home  ~trial Hydralazine 2.5mg IVP q8h for SBP > 160mmHg   ~patient w/ significant allergy profile  ~cont. Diltiazem ER 180mg po daily  ~f/u w/ Cardiology in the am    #Anxiety  ~patient with notable significant family stressor due to the recent passing of her son.  ~will cont. Alprazolam 0.25mg po bid    #Vte ppx  ~IMPROVE Vte Risk Score is 1  ~cont. SCDs for now              82 y/o F PMHx significant for anxiety, hypertension, multifocal atrial tachycardia, peripheral neuropathy, and meralgia paresthetica of the left lower limb presents to  for further evaluation and management of a near syncopal episode prior to arrival. The patient recall's that today while driving to a doctor's appointment and states she had an acute onset of "feeling very anxious" followed by a near syncopal episode. The patient denies any loss of consciousness. She did describe  prodrome of left sided chest pain which reportedly radiated to her left arm. The patient had an EKG performed at her PCP's office which reportedly revealed "Atrial fibrillation." The patient was subsequently referred to  for further evaluation. Of note the patient admits to several family stressors recently including the recent passing of her son. The patient denies any associated dyspnea, nausea, vomiting, headache, or blurry vision.    #Hypertensive Urgency  #Chest Pain  #Near-Syncope  ~admit to Telemetry  ~serial Cori/EKGs  ~patient only on single agent at home  ~trial Hydralazine 2.5mg IVP q8h for SBP > 160mmHg   ~patient w/ significant allergy profile  ~cont. Diltiazem ER 180mg po daily  ~f/u w/ Cardiology in the am    #Anxiety  ~patient with notable significant family stressor due to the recent passing of her son.  ~will cont. Alprazolam 0.25mg po bid    #Vte ppx  ~IMPROVE Vte Risk Score is 1  ~cont. SCDs for now

## 2022-03-02 NOTE — H&P ADULT - NSHPSOCIALHISTORY_GEN_ALL_CORE
Lives at home  Current non-smoker  Denies hx of illicit drug use Lives at home  Current non-smoker (quits in the 90's)  Denies hx of illicit drug use

## 2022-03-02 NOTE — ED PROVIDER NOTE - CLINICAL SUMMARY MEDICAL DECISION MAKING FREE TEXT BOX
plan: cardiac labs, heart monitor and admit to tele for new onset Afib, near syncope and chest pain.

## 2022-03-02 NOTE — ED ADULT NURSE REASSESSMENT NOTE - NS ED NURSE REASSESS COMMENT FT1
Report received from GREGG Castillo. Pt A&Ox4, resting comfortably. Pt denies any pain or discomfort. VSS. Pending bed assignment. Pt update don plan of care. Verbalized understanding. Report received from GREGG Castillo. Pt A&Ox4, resting comfortably. Pt denies any pain or discomfort. Pt eating dinner. Pending bed assignment. Pt update don plan of care. Verbalized understanding.

## 2022-03-02 NOTE — H&P ADULT - HISTORY OF PRESENT ILLNESS
80 y/o F PMHx significant for anxiety, hypertension, multifocal atrial tachycardia, peripheral neuropathy, and meralgia paresthetica of the left lower limb presents to  for further evaluation and management of a near syncopal episode prior to arrival. The patient recall's that today while driving to a doctor's appointment and states she had an acute onset of "feeling very anxious" followed by a near syncopal episode. The patient denies any loss of consciousness. She did describe  prodrome of left sided chest pain which reportedly radiated to her left arm. The patient had an EKG performed at her PCP's office which reportedly revealed "Atrial fibrillation." The patient was subsequently referred to  for further evaluation. Of note the patient admits to several family stressors recently including the recent passing of her son. The patient denies any associated dyspnea, nausea, vomiting, headache, or blurry vision.

## 2022-03-02 NOTE — H&P ADULT - NSHPPHYSICALEXAM_GEN_ALL_CORE
Vital Signs Last 24 Hrs  T(C): 36.6 (02 Mar 2022 17:02), Max: 36.8 (02 Mar 2022 12:25)  T(F): 97.9 (02 Mar 2022 17:02), Max: 98.2 (02 Mar 2022 12:25)  HR: 87 (02 Mar 2022 17:02) (87 - 104)  BP: 175/61 (02 Mar 2022 17:02) (175/61 - 193/94)  BP(mean): 94 (02 Mar 2022 17:02) (94 - 124)  RR: 18 (02 Mar 2022 17:02) (18 - 20)  SpO2: 98% (02 Mar 2022 17:02) (97% - 98%)

## 2022-03-03 DIAGNOSIS — R55 SYNCOPE AND COLLAPSE: ICD-10-CM

## 2022-03-03 DIAGNOSIS — I10 ESSENTIAL (PRIMARY) HYPERTENSION: ICD-10-CM

## 2022-03-03 DIAGNOSIS — I48.0 PAROXYSMAL ATRIAL FIBRILLATION: ICD-10-CM

## 2022-03-03 DIAGNOSIS — F41.9 ANXIETY DISORDER, UNSPECIFIED: ICD-10-CM

## 2022-03-03 LAB
ALBUMIN SERPL ELPH-MCNC: 3.3 G/DL — SIGNIFICANT CHANGE UP (ref 3.3–5)
ALP SERPL-CCNC: 79 U/L — SIGNIFICANT CHANGE UP (ref 40–120)
ALT FLD-CCNC: 25 U/L — SIGNIFICANT CHANGE UP (ref 12–78)
ANION GAP SERPL CALC-SCNC: 8 MMOL/L — SIGNIFICANT CHANGE UP (ref 5–17)
AST SERPL-CCNC: 23 U/L — SIGNIFICANT CHANGE UP (ref 15–37)
BASOPHILS # BLD AUTO: 0.05 K/UL — SIGNIFICANT CHANGE UP (ref 0–0.2)
BASOPHILS NFR BLD AUTO: 0.5 % — SIGNIFICANT CHANGE UP (ref 0–2)
BILIRUB SERPL-MCNC: 1.2 MG/DL — SIGNIFICANT CHANGE UP (ref 0.2–1.2)
BUN SERPL-MCNC: 12 MG/DL — SIGNIFICANT CHANGE UP (ref 7–23)
CALCIUM SERPL-MCNC: 9 MG/DL — SIGNIFICANT CHANGE UP (ref 8.5–10.1)
CHLORIDE SERPL-SCNC: 105 MMOL/L — SIGNIFICANT CHANGE UP (ref 96–108)
CO2 SERPL-SCNC: 27 MMOL/L — SIGNIFICANT CHANGE UP (ref 22–31)
CREAT SERPL-MCNC: 0.63 MG/DL — SIGNIFICANT CHANGE UP (ref 0.5–1.3)
EGFR: 88 ML/MIN/1.73M2 — SIGNIFICANT CHANGE UP
EOSINOPHIL # BLD AUTO: 0.11 K/UL — SIGNIFICANT CHANGE UP (ref 0–0.5)
EOSINOPHIL NFR BLD AUTO: 1.1 % — SIGNIFICANT CHANGE UP (ref 0–6)
GLUCOSE SERPL-MCNC: 130 MG/DL — HIGH (ref 70–99)
HCT VFR BLD CALC: 43.9 % — SIGNIFICANT CHANGE UP (ref 34.5–45)
HGB BLD-MCNC: 14.4 G/DL — SIGNIFICANT CHANGE UP (ref 11.5–15.5)
IMM GRANULOCYTES NFR BLD AUTO: 0.4 % — SIGNIFICANT CHANGE UP (ref 0–1.5)
LYMPHOCYTES # BLD AUTO: 1.98 K/UL — SIGNIFICANT CHANGE UP (ref 1–3.3)
LYMPHOCYTES # BLD AUTO: 19.6 % — SIGNIFICANT CHANGE UP (ref 13–44)
MCHC RBC-ENTMCNC: 26.8 PG — LOW (ref 27–34)
MCHC RBC-ENTMCNC: 32.8 GM/DL — SIGNIFICANT CHANGE UP (ref 32–36)
MCV RBC AUTO: 81.6 FL — SIGNIFICANT CHANGE UP (ref 80–100)
MONOCYTES # BLD AUTO: 0.9 K/UL — SIGNIFICANT CHANGE UP (ref 0–0.9)
MONOCYTES NFR BLD AUTO: 8.9 % — SIGNIFICANT CHANGE UP (ref 2–14)
NEUTROPHILS # BLD AUTO: 7.01 K/UL — SIGNIFICANT CHANGE UP (ref 1.8–7.4)
NEUTROPHILS NFR BLD AUTO: 69.5 % — SIGNIFICANT CHANGE UP (ref 43–77)
PLATELET # BLD AUTO: 271 K/UL — SIGNIFICANT CHANGE UP (ref 150–400)
POTASSIUM SERPL-MCNC: 3.6 MMOL/L — SIGNIFICANT CHANGE UP (ref 3.5–5.3)
POTASSIUM SERPL-SCNC: 3.6 MMOL/L — SIGNIFICANT CHANGE UP (ref 3.5–5.3)
PROT SERPL-MCNC: 7.5 GM/DL — SIGNIFICANT CHANGE UP (ref 6–8.3)
RBC # BLD: 5.38 M/UL — HIGH (ref 3.8–5.2)
RBC # FLD: 14.6 % — HIGH (ref 10.3–14.5)
SODIUM SERPL-SCNC: 140 MMOL/L — SIGNIFICANT CHANGE UP (ref 135–145)
WBC # BLD: 10.09 K/UL — SIGNIFICANT CHANGE UP (ref 3.8–10.5)
WBC # FLD AUTO: 10.09 K/UL — SIGNIFICANT CHANGE UP (ref 3.8–10.5)

## 2022-03-03 PROCEDURE — 93306 TTE W/DOPPLER COMPLETE: CPT | Mod: 26

## 2022-03-03 PROCEDURE — 99233 SBSQ HOSP IP/OBS HIGH 50: CPT

## 2022-03-03 RX ORDER — LOSARTAN POTASSIUM 100 MG/1
25 TABLET, FILM COATED ORAL ONCE
Refills: 0 | Status: COMPLETED | OUTPATIENT
Start: 2022-03-03 | End: 2022-03-03

## 2022-03-03 RX ORDER — LOSARTAN POTASSIUM 100 MG/1
25 TABLET, FILM COATED ORAL DAILY
Refills: 0 | Status: DISCONTINUED | OUTPATIENT
Start: 2022-03-03 | End: 2022-03-03

## 2022-03-03 RX ORDER — DILTIAZEM HCL 120 MG
240 CAPSULE, EXT RELEASE 24 HR ORAL DAILY
Refills: 0 | Status: DISCONTINUED | OUTPATIENT
Start: 2022-03-03 | End: 2022-03-04

## 2022-03-03 RX ORDER — INFLUENZA VIRUS VACCINE 15; 15; 15; 15 UG/.5ML; UG/.5ML; UG/.5ML; UG/.5ML
0.7 SUSPENSION INTRAMUSCULAR ONCE
Refills: 0 | Status: DISCONTINUED | OUTPATIENT
Start: 2022-03-03 | End: 2022-03-04

## 2022-03-03 RX ORDER — ALPRAZOLAM 0.25 MG
0.25 TABLET ORAL THREE TIMES A DAY
Refills: 0 | Status: DISCONTINUED | OUTPATIENT
Start: 2022-03-03 | End: 2022-03-04

## 2022-03-03 RX ORDER — DILTIAZEM HCL 120 MG
10 CAPSULE, EXT RELEASE 24 HR ORAL ONCE
Refills: 0 | Status: COMPLETED | OUTPATIENT
Start: 2022-03-03 | End: 2022-03-03

## 2022-03-03 RX ORDER — APIXABAN 2.5 MG/1
5 TABLET, FILM COATED ORAL
Refills: 0 | Status: DISCONTINUED | OUTPATIENT
Start: 2022-03-03 | End: 2022-03-04

## 2022-03-03 RX ORDER — LOSARTAN POTASSIUM 100 MG/1
50 TABLET, FILM COATED ORAL DAILY
Refills: 0 | Status: DISCONTINUED | OUTPATIENT
Start: 2022-03-04 | End: 2022-03-04

## 2022-03-03 RX ADMIN — PREGABALIN 1000 MICROGRAM(S): 225 CAPSULE ORAL at 11:42

## 2022-03-03 RX ADMIN — Medication 2.5 MILLIGRAM(S): at 21:46

## 2022-03-03 RX ADMIN — Medication 240 MILLIGRAM(S): at 11:40

## 2022-03-03 RX ADMIN — LOSARTAN POTASSIUM 25 MILLIGRAM(S): 100 TABLET, FILM COATED ORAL at 11:41

## 2022-03-03 RX ADMIN — Medication 2000 UNIT(S): at 11:40

## 2022-03-03 RX ADMIN — LOSARTAN POTASSIUM 25 MILLIGRAM(S): 100 TABLET, FILM COATED ORAL at 17:14

## 2022-03-03 RX ADMIN — APIXABAN 5 MILLIGRAM(S): 2.5 TABLET, FILM COATED ORAL at 11:42

## 2022-03-03 RX ADMIN — Medication 2.5 MILLIGRAM(S): at 05:55

## 2022-03-03 RX ADMIN — Medication 10 MILLIGRAM(S): at 01:54

## 2022-03-03 RX ADMIN — APIXABAN 5 MILLIGRAM(S): 2.5 TABLET, FILM COATED ORAL at 21:46

## 2022-03-03 NOTE — PROVIDER CONTACT NOTE (OTHER) - SITUATION
answering service aware of consult
Patient arrived to [facility] verbalized anxiety, denies any chest pain or trouble breathing. BP on arrival at 00:50 was 185/72 P 88.

## 2022-03-03 NOTE — PROVIDER CONTACT NOTE (OTHER) - ACTION/TREATMENT ORDERED:
Dr. Whitehead agrees on giving another dose of cardizem, will recheck BP. No further interventions at this time.

## 2022-03-03 NOTE — CONSULT NOTE ADULT - ASSESSMENT
3/3/22:  Pt with above history and near syncope and PAF aggravated by increased stress and recent death of her son.  She will need AC and would start Eliquis 5 mg bid and stop the ASA for now.  A repeat echo is reasonable but can be done in my office if nowt done here.  BP control as per medicine and would add low dose Metoprolol to the Diltiazem both for BP and AF prevention.  Consider adding an ARB for her BP (Losartan 50 mg daily) if BP stays high.  Continue as outlined per medicine etal.  Will follow.

## 2022-03-03 NOTE — PATIENT PROFILE ADULT - FALL HARM RISK - HARM RISK INTERVENTIONS

## 2022-03-03 NOTE — PATIENT PROFILE ADULT - FALL HARM RISK - DEVICES
Class I (easy) - visualization of the soft palate, fauces, uvula, and both anterior and posterior pillars
None

## 2022-03-03 NOTE — CONSULT NOTE ADULT - SUBJECTIVE AND OBJECTIVE BOX
CHIEF COMPLAINT:  Patient is a 81y old  Female who presents with a chief complaint of Chest pain, Near Syncope (02 Mar 2022 17:53)      HPI: 3/3/22:  82 y/o F well known to me with PMHx significant for anxiety, hypertension, multifocal atrial tachycardia and PAF, peripheral neuropathy, and meralgia paresthetica of the left lower limb presents to  for further evaluation and management of a near syncopal episode prior to arrival. The patient recall's that today while driving to a doctor's appointment for her daughter and states she had an acute onset of "feeling very anxious" followed by a near syncopal episode. The patient denies any loss of consciousness. She did describe  prodrome of left sided chest pain which reportedly radiated to her left arm. The patient had an EKG performed at her daughter's PCP office which reportedly revealed "Atrial fibrillation." The patient was subsequently referred to  for further evaluation. Of note the patient admits to several family stressors recently including the recent passing of her son. The patient denies any associated dyspnea, nausea, vomiting, headache, or blurry vision. In the ER her initial EKG showed AF but converted back to NSR subsequently and she feel s good now. She denies anginal chest pain or increased SOB.  Initial Troponin levels were (-) x 2 so far.  She is eager to go home.  (02 Mar 2022 17:53)        PMHx:  PAST MEDICAL & SURGICAL HISTORY:  Anxiety  HTN (hypertension)  Peripheral neuropathy  Meralgia paresthetica, left lower limb  Chronic lower back pain  Multifocal atrial tachycardia and PAF  No significant past surgical history      FAMILY HISTORY:   FAMILY HISTORY:  FH: Alzheimers disease (Father)  Family history of multiple myeloma (Sibling)        ALLERGIES:  Allergies  beta blockers (Unknown)  celery (Unknown)  Inderal LA (Unknown)  latex (Unknown)  penicillins (Unknown)        REVIEW OF SYSTEMS:  10 point ROS was obtained  Pertinent positives and negatives are as above  All other review of systems is negative unless indicated above      Vital Signs Last 24 Hrs  T(C): 36.7 (03 Mar 2022 00:56), Max: 36.8 (02 Mar 2022 12:25)  T(F): 98 (03 Mar 2022 00:56), Max: 98.2 (02 Mar 2022 12:25)  HR: 100 (03 Mar 2022 05:47) (85 - 104)  BP: 179/83 (03 Mar 2022 05:47) (154/108 - 212/95)  BP(mean): 101 (03 Mar 2022 05:47) (94 - 130)  RR: 18 (03 Mar 2022 00:56) (16 - 20)  SpO2: 96% (03 Mar 2022 00:56) (96% - 99%)        PHYSICAL EXAM:   Constitutional: NAD, awake and alert, well-developed  HEENT: PERR, EOMI, Normal Hearing, MMM  Neck: Soft and supple, No LAD, No JVD  Respiratory: Breath sounds are clear bilaterally, No wheezing, rales or rhonchi  Cardiovascular: S1 and S2, regular rate and rhythm, soft LUCA at LLSB and base as before, no gallops or rubs  Gastrointestinal: Bowel Sounds present, soft, nontender, nondistended, no guarding, no rebound  Extremities: No peripheral edema  Vascular: 2+ peripheral pulses  Neurological: A/O x 3, no focal deficits  Musculoskeletal: 5/5 strength b/l upper and lower extremities  Skin: No rashes      MEDICATIONS  (STANDING):  aspirin  chewable 81 milliGRAM(s) Oral daily  cholecalciferol 2000 Unit(s) Oral daily  cyanocobalamin 1000 MICROGram(s) Oral daily  diltiazem    milliGRAM(s) Oral daily  influenza  Vaccine (HIGH DOSE) 0.7 milliLiter(s) IntraMuscular once    MEDICATIONS  (PRN):  acetaminophen     Tablet .. 650 milliGRAM(s) Oral every 6 hours PRN Temp greater or equal to 38C (100.4F), Mild Pain (1 - 3)  aluminum hydroxide/magnesium hydroxide/simethicone Suspension 30 milliLiter(s) Oral every 4 hours PRN Dyspepsia  hydrALAZINE Injectable 2.5 milliGRAM(s) IV Push every 8 hours PRN Hypertension  melatonin 3 milliGRAM(s) Oral at bedtime PRN Insomnia  ondansetron Injectable 4 milliGRAM(s) IV Push every 8 hours PRN Nausea and/or Vomiting      LABS: All Labs Reviewed:                        15.8   9.09  )-----------( 290      ( 02 Mar 2022 12:56 )             48.0     03-02    140  |  105  |  13  ----------------------------<  92  3.9   |  29  |  0.57    Ca    9.4      02 Mar 2022 14:05  Mg     2.5     03-02    TPro  8.5<H>  /  Alb  3.7  /  TBili  0.7  /  DBili  x   /  AST  27  /  ALT  28  /  AlkPhos  93  03-02    PT/INR - ( 02 Mar 2022 12:56 )   PT: 13.4 sec;   INR: 1.15 ratio       PTT - ( 02 Mar 2022 12:56 )  PTT:35.0 sec    Troponin I, High Sensitivity (03.02.22 @ 16:29): 28.24  Troponin I, High Sensitivity (03.02.22 @ 14:05): 21.77    BLOOD CULTURES:   LIPID PROFILE     RADIOLOGY:    CXR; 3/2/22:  INTERPRETATION:  Clinical history: 81-year-old female, chest pain.  Portable view of the chest is compared to 3/31/2018 and demonstrate a normal cardiac silhouette and normal pulmonary vasculature with no consolidation, effusion, gross adenopathy, pneumothorax or acute osseous finding.  IMPRESSION:  No acute radiographic findings      EKG:  3/2/22:  A. fib, NSSTTwave changes    TELEMETRY:  AF=>NSR with APC's and blocked APC's    ECHO:  Echo in my office on 5/10/21 showed normal LV systolic function with LVEF=60-65% with mild RAMON and mild outflow tract obstruction at rest (10mm Hg at rest and 20mmHg after Valsalva) with mild MAC/MR and mild TR but otherwise a normal study.

## 2022-03-03 NOTE — PROVIDER CONTACT NOTE (OTHER) - ASSESSMENT
Patient denies any chest pain. While on the phone with Dr. Whitehead re-checked BP b/l on right arm 196/75 Pulse 88 and left arm 212/95 Pulse 90.

## 2022-03-04 ENCOUNTER — TRANSCRIPTION ENCOUNTER (OUTPATIENT)
Age: 82
End: 2022-03-04

## 2022-03-04 VITALS
HEART RATE: 76 BPM | SYSTOLIC BLOOD PRESSURE: 139 MMHG | TEMPERATURE: 98 F | OXYGEN SATURATION: 98 % | RESPIRATION RATE: 19 BRPM | DIASTOLIC BLOOD PRESSURE: 51 MMHG

## 2022-03-04 PROCEDURE — 99239 HOSP IP/OBS DSCHRG MGMT >30: CPT

## 2022-03-04 RX ORDER — AMLODIPINE BESYLATE 2.5 MG/1
5 TABLET ORAL DAILY
Refills: 0 | Status: DISCONTINUED | OUTPATIENT
Start: 2022-03-04 | End: 2022-03-04

## 2022-03-04 RX ORDER — DILTIAZEM HCL 120 MG
1 CAPSULE, EXT RELEASE 24 HR ORAL
Qty: 15 | Refills: 0
Start: 2022-03-04 | End: 2022-03-18

## 2022-03-04 RX ORDER — DILTIAZEM HCL 120 MG
1 CAPSULE, EXT RELEASE 24 HR ORAL
Qty: 0 | Refills: 0 | DISCHARGE

## 2022-03-04 RX ORDER — DILTIAZEM HCL 120 MG
1 CAPSULE, EXT RELEASE 24 HR ORAL
Qty: 30 | Refills: 0
Start: 2022-03-04 | End: 2022-04-02

## 2022-03-04 RX ORDER — LOSARTAN POTASSIUM 100 MG/1
100 TABLET, FILM COATED ORAL DAILY
Refills: 0 | Status: DISCONTINUED | OUTPATIENT
Start: 2022-03-04 | End: 2022-03-04

## 2022-03-04 RX ORDER — LOSARTAN POTASSIUM 100 MG/1
1 TABLET, FILM COATED ORAL
Qty: 30 | Refills: 0
Start: 2022-03-04 | End: 2022-04-02

## 2022-03-04 RX ORDER — APIXABAN 2.5 MG/1
1 TABLET, FILM COATED ORAL
Qty: 60 | Refills: 0
Start: 2022-03-04 | End: 2022-04-02

## 2022-03-04 RX ORDER — AMLODIPINE BESYLATE 2.5 MG/1
1 TABLET ORAL
Qty: 30 | Refills: 0
Start: 2022-03-04 | End: 2022-04-02

## 2022-03-04 RX ADMIN — LOSARTAN POTASSIUM 100 MILLIGRAM(S): 100 TABLET, FILM COATED ORAL at 11:29

## 2022-03-04 RX ADMIN — Medication 2000 UNIT(S): at 11:36

## 2022-03-04 RX ADMIN — APIXABAN 5 MILLIGRAM(S): 2.5 TABLET, FILM COATED ORAL at 11:30

## 2022-03-04 RX ADMIN — Medication 2.5 MILLIGRAM(S): at 05:48

## 2022-03-04 RX ADMIN — Medication 240 MILLIGRAM(S): at 11:29

## 2022-03-04 RX ADMIN — AMLODIPINE BESYLATE 5 MILLIGRAM(S): 2.5 TABLET ORAL at 07:31

## 2022-03-04 RX ADMIN — Medication 0.25 MILLIGRAM(S): at 11:28

## 2022-03-04 RX ADMIN — PREGABALIN 1000 MICROGRAM(S): 225 CAPSULE ORAL at 11:36

## 2022-03-04 NOTE — DISCHARGE NOTE PROVIDER - NSDCMRMEDTOKEN_GEN_ALL_CORE_FT
amLODIPine 5 mg oral tablet: 1 tab(s) orally once a day  apixaban 5 mg oral tablet: 1 tab(s) orally 2 times a day  apixaban 5 mg oral tablet: 1 tab(s) orally 2 times a day  aspirin 81 mg oral tablet, chewable: 1 tab(s) orally once a day (at bedtime)  cyanocobalamin 1000 mcg oral tablet: 1 tab(s) orally once a day  dilTIAZem 180 mg/24 hours oral capsule, extended release: 1 cap(s) orally once a day  losartan 100 mg oral tablet: 1 tab(s) orally once a day  Vitamin D3 50 mcg (2000 intl units) oral tablet: 1 tab(s) orally once a day   amLODIPine 5 mg oral tablet: 1 tab(s) orally once a day  apixaban 5 mg oral tablet: 1 tab(s) orally 2 times a day  apixaban 5 mg oral tablet: 1 tab(s) orally 2 times a day  Cardizem  mg/24 hours oral capsule, extended release: 1 cap(s) orally once a day   cyanocobalamin 1000 mcg oral tablet: 1 tab(s) orally once a day  losartan 100 mg oral tablet: 1 tab(s) orally once a day  Vitamin D3 50 mcg (2000 intl units) oral tablet: 1 tab(s) orally once a day

## 2022-03-04 NOTE — DISCHARGE NOTE NURSING/CASE MANAGEMENT/SOCIAL WORK - PATIENT PORTAL LINK FT
You can access the FollowMyHealth Patient Portal offered by [facility] by registering at the following website: [email or web address]. By joining [employer]’s FollowMyHealth portal, you will also be able to view your health information using other applications (apps) compatible with our system.

## 2022-03-04 NOTE — DISCHARGE NOTE PROVIDER - HOSPITAL COURSE
CC: Chest pain, Near Syncope (03 Mar 2022 07:07)    HPI:  82 y/o F PMHx significant for anxiety, hypertension, multifocal atrial tachycardia, peripheral neuropathy, and meralgia paresthetica of the left lower limb presents to  for further evaluation and management of a near syncopal episode prior to arrival. The patient recall's that today while driving to a doctor's appointment and states she had an acute onset of "feeling very anxious" followed by a near syncopal episode. The patient denies any loss of consciousness. She did describe  prodrome of left sided chest pain which reportedly radiated to her left arm. The patient had an EKG performed at her PCP's office which reportedly revealed "Atrial fibrillation." The patient was subsequently referred to  for further evaluation. Of note the patient admits to several family stressors recently including the recent passing of her son. The patient denies any associated dyspnea, nausea, vomiting, headache, or blurry vision.      Denies any HA, CP, SOB. No fevers, chills or shakes. Labs and vitals reviewed. Patient is very anxious. Still grieving death of his son. we shared all the lab work /  imaging. Dr. Barbosa is aware of discharge. D/C planning.     Physical Exam:   GENERAL APPEARANCE:  NAD, hemodynamically stable  T(C): 36.5 (03-04-22 @ 08:37), Max: 36.9 (03-03-22 @ 16:05)  HR: 76 (03-04-22 @ 08:37) (76 - 88)  BP: 139/51 (03-04-22 @ 08:37) (139/51 - 181/66)  RR: 19 (03-04-22 @ 08:37) (17 - 19)  SpO2: 98% (03-04-22 @ 08:37) (96% - 98%)  HEENT:  Head is normocephalic    Skin:  Warm and dry without any rash   NECK:  Supple without lymphadenopathy.   HEART:  Regular rate and rhythm. normal S1 and S2, No M/R/G  LUNGS:  Good ins/exp effort, no W/R/R/C  ABDOMEN:  Soft, nontender, nondistended with good bowel sounds heard  EXTREMITIES:  Without cyanosis, clubbing or edema.   NEUROLOGICAL:  Gross nonfocal

## 2022-03-04 NOTE — PROGRESS NOTE ADULT - ASSESSMENT
3/3/22:  Pt with above history and near syncope and PAF aggravated by increased stress and recent death of her son.  She will need AC and would start Eliquis 5 mg bid and stop the ASA for now.  A repeat echo is reasonable but can be done in my office if nowt done here.  BP control as per medicine and would add low dose Metoprolol to the Diltiazem both for BP and AF prevention.  Consider adding an ARB for her BP (Losartan 50 mg daily) if BP stays high.  Continue as outlined per medicine etal.  Will follow.    3/4/22:  Feeling good but anxious about her BP which is still high.  She had been on Dyazide yrs ago for her BP but with her HOCM, will try to avoid diuretics for now.  She did not tolerate beta blockers in the past due to wheezing and asthma.  Started on Losartan and Diltiazem was increased but BP still not controlled.  Will add Amlodipine.  Echo as below with normal V systolic function and LVEF=65-70% with intracavitary gradient again seen c/w mild HOCM, small gradient across the MV suggestive of moderate MS by the report but mild at worst by my review.  If BP ok can be discharged home and will follow and monitor BP as an outpt.  Continue Eliquis and increase Losartan 10 100 mg daily and will add Amlodipine.
82 y/o F PMHx significant for anxiety, hypertension, multifocal atrial tachycardia, peripheral neuropathy, and meralgia paresthetica of the left lower limb  admitted for:       #Hypertensive Urgency  BP still elevated   Start Losartan, increase dose of Cardizem, Pt has allergy yo BB   Monitor BP, hydralazine PRN  D/w DR Gao     # Chest pain, likely 2/2 AFIB, anxiety and Hypertensive urgency   Trops neg, EKG no acute changes  ECHO pending     # H/o PAFIB   c/w tele: now in AFIB, HR controlled  C/w Cardizem  Started on Eliquis  ECHO  D/w cardio       #Anxiety/Panic Attack?   patient with notable significant family stressor due to the recent passing of her son.  start  Alprazolam 0.25mg po TID, PRN   Psych eval     #Vte ppx  Eliquis     Dispo: psych  eval, d/c planning when BP improved

## 2022-03-04 NOTE — DISCHARGE NOTE NURSING/CASE MANAGEMENT/SOCIAL WORK - NSDCFUADDAPPT_GEN_ALL_CORE_FT
Follow up appointment scheduled with Dr. Jono Gao on Thursday, 3/10/2022, at 3:30PM. [facility] [address] [address], [place], [place]

## 2022-03-04 NOTE — PROGRESS NOTE ADULT - SUBJECTIVE AND OBJECTIVE BOX
CC: Chest pain, Near Syncope (03 Mar 2022 07:07)    HPI:  80 y/o F PMHx significant for anxiety, hypertension, multifocal atrial tachycardia, peripheral neuropathy, and meralgia paresthetica of the left lower limb presents to  for further evaluation and management of a near syncopal episode prior to arrival. The patient recall's that today while driving to a doctor's appointment and states she had an acute onset of "feeling very anxious" followed by a near syncopal episode. The patient denies any loss of consciousness. She did describe  prodrome of left sided chest pain which reportedly radiated to her left arm. The patient had an EKG performed at her PCP's office which reportedly revealed "Atrial fibrillation." The patient was subsequently referred to  for further evaluation. Of note the patient admits to several family stressors recently including the recent passing of her son. The patient denies any associated dyspnea, nausea, vomiting, headache, or blurry vision. (02 Mar 2022 17:53)    INTERVAL HPI/ OVERNIGHT EVENTS: chart reviewed, Pt was seen and examined, looks anxious, reported that was in the car stuck  in traffic and  was  nervious about it, also reports that got  to doctors office  building and was not able to find  office right away, was hot and  became very anxious and lightheaded. Pt does think that could be panic  attack and agrees     Vital Signs Last 24 Hrs  T(C): 36.9 (03 Mar 2022 16:05), Max: 37.1 (03 Mar 2022 08:35)  T(F): 98.5 (03 Mar 2022 16:05), Max: 98.7 (03 Mar 2022 08:35)  HR: 87 (03 Mar 2022 16:05) (85 - 100)  BP: 181/66 (03 Mar 2022 16:05) (154/108 - 212/95)  BP(mean): 105 (03 Mar 2022 13:34) (94 - 130)  RR: 17 (03 Mar 2022 16:05) (16 - 20)  SpO2: 96% (03 Mar 2022 16:05) (94% - 99%)      REVIEW OF SYSTEMS:  All other review of systems is negative unless indicated above.      PHYSICAL EXAM:  General: Well developed;  in no acute distress  Eyes: PERRLA, EOMI; conjunctiva and sclera clear  Head: Normocephalic; atraumatic  ENMT: No nasal discharge; airway clear  Neck: Supple; non tender; no masses  Respiratory: No wheezes, rales or rhonchi  Cardiovascular: Irregular rate and rhythm. S1 and S2 Normal;   Gastrointestinal: Soft non-tender non-distended; Normal bowel sounds  Genitourinary: No  suprapubic  tenderness  Extremities: No edema  Vascular: Peripheral pulses palpable 2+ bilaterally  Neurological: Alert and oriented x43, speech clear, no facial asymmetry, MS 5/5   Skin: Warm and dry. No acute rash  Musculoskeletal: Normal muscle tone, without deformities  Psychiatric: Cooperative, anxious     LABS:                         14.4   10.09 )-----------( 271      ( 03 Mar 2022 07:45 )             43.9     03 Mar 2022 07:45    140    |  105    |  12     ----------------------------<  130    3.6     |  27     |  0.63     Ca    9.0        03 Mar 2022 07:45  Mg     2.5       02 Mar 2022 14:05    TPro  7.5    /  Alb  3.3    /  TBili  1.2    /  DBili  x      /  AST  23     /  ALT  25     /  AlkPhos  79     03 Mar 2022 07:45    PT/INR - ( 02 Mar 2022 12:56 )   PT: 13.4 sec;   INR: 1.15 ratio    PTT - ( 02 Mar 2022 12:56 )  PTT:35.0 sec        LIVER FUNCTIONS - ( 03 Mar 2022 07:45 )  Alb: 3.3 g/dL / Pro: 7.5 gm/dL / ALK PHOS: 79 U/L / ALT: 25 U/L / AST: 23 U/L / GGT: x           Urinalysis Basic - ( 02 Mar 2022 12:56 )    Color: Yellow / Appearance: Clear / S.010 / pH: x  Gluc: x / Ketone: Negative  / Bili: Negative / Urobili: Negative   Blood: x / Protein: 30 mg/dL / Nitrite: Negative   Leuk Esterase: Small / RBC: 0-2 /HPF / WBC 3-5   Sq Epi: x / Non Sq Epi: Occasional / Bacteria: Few        MEDICATIONS  (STANDING):  apixaban 5 milliGRAM(s) Oral two times a day  cholecalciferol 2000 Unit(s) Oral daily  cyanocobalamin 1000 MICROGram(s) Oral daily  diltiazem    milliGRAM(s) Oral daily  influenza  Vaccine (HIGH DOSE) 0.7 milliLiter(s) IntraMuscular once  losartan 25 milliGRAM(s) Oral daily    MEDICATIONS  (PRN):  acetaminophen     Tablet .. 650 milliGRAM(s) Oral every 6 hours PRN Temp greater or equal to 38C (100.4F), Mild Pain (1 - 3)  aluminum hydroxide/magnesium hydroxide/simethicone Suspension 30 milliLiter(s) Oral every 4 hours PRN Dyspepsia  hydrALAZINE Injectable 2.5 milliGRAM(s) IV Push every 8 hours PRN Hypertension  melatonin 3 milliGRAM(s) Oral at bedtime PRN Insomnia  ondansetron Injectable 4 milliGRAM(s) IV Push every 8 hours PRN Nausea and/or Vomiting          RADIOLOGY & ADDITIONAL TESTS:      ACC: [id number] EXAM:  XR CHEST PORTABLE IMMED 1V                          PROCEDURE DATE:  2022          INTERPRETATION:  Clinical history: 81-year-old female, chest pain.    Portable view of the chest is compared to 3/31/2018 and demonstrate a   normal cardiac silhouette and normal pulmonary vasculature with no   consolidation, effusion, gross adenopathy, pneumothorax or acute osseous   finding.    IMPRESSION:  No acute radiographic findings      
PT states that in 2017 she was billed  $665:00 for psych consult and she does not want to see psychiatrist now.   Informed RN and CSW.   CSW can clarify and inform the pt about her insurance  coverage and please call psychiatry once pt agrees with consult.    
  CHIEF COMPLAINT:  Patient is a 81y old  Female who presents with a chief complaint of Chest pain, Near Syncope (02 Mar 2022 17:53)      HPI: 3/3/22:  82 y/o F well known to me with PMHx significant for anxiety, hypertension, multifocal atrial tachycardia and PAF, peripheral neuropathy, and meralgia paresthetica of the left lower limb presents to  for further evaluation and management of a near syncopal episode prior to arrival. The patient recall's that today while driving to a doctor's appointment for her daughter and states she had an acute onset of "feeling very anxious" followed by a near syncopal episode. The patient denies any loss of consciousness. She did describe  prodrome of left sided chest pain which reportedly radiated to her left arm. The patient had an EKG performed at her daughter's PCP office which reportedly revealed "Atrial fibrillation." The patient was subsequently referred to  for further evaluation. Of note the patient admits to several family stressors recently including the recent passing of her son. The patient denies any associated dyspnea, nausea, vomiting, headache, or blurry vision. In the ER her initial EKG showed AF but converted back to NSR subsequently and she feel s good now. She denies anginal chest pain or increased SOB.  Initial Troponin levels were (-) x 2 so far.  She is eager to go home.  (02 Mar 2022 17:53)    3/4/22:  Feeling good but anxious about her BP which is still high.  She had been on Dyazide yrs ago for her BP but with her HOCM, will try to avoid diuretics for now.  She did not tolerate beta blockers in the past due to wheezing and asthma.  Started on Losartan and Diltiazem was increased but BP still not controlled.  Will add Amlodipine.  Echo as below with normal V systolic function and LVEF=65-70% with intracavitary gradient again seen c/w mild HOCM, small gradient across the MV suggestive of moderate MS by the report but mild at worst by my review.        PMHx:  PAST MEDICAL & SURGICAL HISTORY:  Anxiety  HTN (hypertension)  Peripheral neuropathy  Meralgia paresthetica, left lower limb  Chronic lower back pain  Multifocal atrial tachycardia and PAF  No significant past surgical history      FAMILY HISTORY:   FAMILY HISTORY:  FH: Alzheimers disease (Father)  Family history of multiple myeloma (Sibling)        ALLERGIES:  Allergies  beta blockers (Unknown)  celery (Unknown)  Inderal LA (Unknown)  latex (Unknown)  penicillins (Unknown)        REVIEW OF SYSTEMS:  10 point ROS was obtained  Pertinent positives and negatives are as above  All other review of systems is negative unless indicated above        Vital Signs Last 24 Hrs  T(C): 36.7 (03 Mar 2022 21:40), Max: 37.1 (03 Mar 2022 08:35)  T(F): 98 (03 Mar 2022 21:40), Max: 98.7 (03 Mar 2022 08:35)  HR: 78 (03 Mar 2022 21:40) (78 - 94)  BP: 163/62 (03 Mar 2022 21:40) (160/70 - 186/71)  BP(mean): 91 (03 Mar 2022 21:40) (91 - 105)  RR: 18 (03 Mar 2022 21:40) (17 - 18)  SpO2: 97% (03 Mar 2022 21:40) (94% - 98%)      PHYSICAL EXAM:   Constitutional: NAD, awake and alert, well-developed  HEENT: PERR, EOMI, Normal Hearing, MMM  Neck: Soft and supple, No LAD, No JVD  Respiratory: Breath sounds are clear bilaterally, No wheezing, rales or rhonchi  Cardiovascular: S1 and S2, regular rate and rhythm, soft LUCA at LLSB and base as before, no gallops or rubs  Gastrointestinal: Bowel Sounds present, soft, nontender, nondistended, no guarding, no rebound  Extremities: No peripheral edema  Vascular: 2+ peripheral pulses  Neurological: A/O x 3, no focal deficits  Musculoskeletal: 5/5 strength b/l upper and lower extremities  Skin: No rashes      MEDICATIONS  (STANDING):  apixaban 5 milliGRAM(s) Oral two times a day  cholecalciferol 2000 Unit(s) Oral daily  cyanocobalamin 1000 MICROGram(s) Oral daily  diltiazem    milliGRAM(s) Oral daily  influenza  Vaccine (HIGH DOSE) 0.7 milliLiter(s) IntraMuscular once  losartan 50 milliGRAM(s) Oral daily    MEDICATIONS  (PRN):  acetaminophen     Tablet .. 650 milliGRAM(s) Oral every 6 hours PRN Temp greater or equal to 38C (100.4F), Mild Pain (1 - 3)  ALPRAZolam 0.25 milliGRAM(s) Oral three times a day PRN anxietty  aluminum hydroxide/magnesium hydroxide/simethicone Suspension 30 milliLiter(s) Oral every 4 hours PRN Dyspepsia  hydrALAZINE Injectable 2.5 milliGRAM(s) IV Push every 8 hours PRN Hypertension  melatonin 3 milliGRAM(s) Oral at bedtime PRN Insomnia  ondansetron Injectable 4 milliGRAM(s) IV Push every 8 hours PRN Nausea and/or Vomiting      LABS: All Labs Reviewed:                        14.4   10.09 )-----------( 271      ( 03 Mar 2022 07:45 )             43.9                           15.8   9.09  )-----------( 290      ( 02 Mar 2022 12:56 )             48.0       03-03    140  |  105  |  12  ----------------------------<  130<H>  3.6   |  27  |  0.63    Ca    9.0      03 Mar 2022 07:45  Mg     2.5     03-02    TPro  7.5  /  Alb  3.3  /  TBili  1.2  /  DBili  x   /  AST  23  /  ALT  25  /  AlkPhos  79  03-03      03-02    140  |  105  |  13  ----------------------------<  92  3.9   |  29  |  0.57    Ca    9.4      02 Mar 2022 14:05  Mg     2.5     03-02    TPro  8.5<H>  /  Alb  3.7  /  TBili  0.7  /  DBili  x   /  AST  27  /  ALT  28  /  AlkPhos  93  03-02    PT/INR - ( 02 Mar 2022 12:56 )   PT: 13.4 sec;   INR: 1.15 ratio       PTT - ( 02 Mar 2022 12:56 )  PTT:35.0 sec    Troponin I, High Sensitivity (03.02.22 @ 16:29): 28.24  Troponin I, High Sensitivity (03.02.22 @ 14:05): 21.77    BLOOD CULTURES:   LIPID PROFILE     RADIOLOGY:    CXR; 3/2/22:  INTERPRETATION:  Clinical history: 81-year-old female, chest pain.  Portable view of the chest is compared to 3/31/2018 and demonstrate a normal cardiac silhouette and normal pulmonary vasculature with no consolidation, effusion, gross adenopathy, pneumothorax or acute osseous finding.  IMPRESSION:  No acute radiographic findings      EKG:  3/2/22:  A. fib, NSSTTwave changes    TELEMETRY:  AF=>NSR with APC's and blocked APC's    ECHO:  Echo in my office on 5/10/21 showed normal LV systolic function with LVEF=60-65% with mild RAMON and mild outflow tract obstruction at rest (10mm Hg at rest and 20mmHg after Valsalva) with mild MAC/MR and mild TR but otherwise a normal study.    ECHO: 3/3/22:  M-Mode Measurements (cm)   LVEDd: 4.95 cm            LVESd: 3.37 cm   IVSEd: 0.94 cm   LVPWd: 0.88cm            AO Root Dimension: 3.2 cm                             ACS: 1.2 cm                             LA: 3.3 cm                             LVOT: 1.8 cm  Doppler Measurements:   AV Velocity:215 cm/s                 MV Peak E-Wave: 107 cm/s   AV Peak Gradient: 18.49 mmHg         MV Peak A-Wave: 115 cm/s   AV Mean Gradient: 9 mmHg             MV E/A Ratio: 0.93 %   AV Area (Continuity):2.2 cm^2        MV Peak Gradient: 4.58 mmHg   TR Velocity:298 cm/s   TR Gradient:35.5216 mmHg   Estimated RAP:3 mmHg   RVSP:38 mmHg    Findings  Mitral Valve   Dense mitral annular calcification. Fibrocalcific changes noted to the mitral valve leaflets with restricted leaflet excursion.   Mean transmitral gradient is 6 mmHg; this finding is consistent with moderate mitral stenosis.   Minimal mitral regurgitation.    Aortic Valve   Moderate aortic sclerosis is present with restricted valvular opening.    Tricuspid Valve   The tricuspid valve leaflets are thin and pliable; valve motion is normal.   Trace tricuspid valve regurgitation is present.   Minimal tricuspid valve regurgitation.   Mild pulmonary hypertension.    Pulmonic Valve   Pulmonic valve not well seen.    Left Atrium   Normal appearing left atrium.    Left Ventricle   Endocardium is not well visualized, however, overall left ventricular systolic function appears normal. Estimated left ventricular ejection fraction is 65-70 %.   An intracavity gradient is noted.   Mild diastolic dysfunction (stage I).    Right Atrium   Normal appearing right atrium.    Right Ventricle   Normal appearing right ventricle structure and function.    Pericardial Effusion   No evidence of pericardial effusion.    Pleural Effusion   No evidence of pleural effusion.    Miscellaneous   The IVC appears normal.    Summary   Endocardium is not well visualized, however, overall left ventricular systolic function appears normal. Estimated left ventricular ejection fraction is 65-70 %.   Mild diastolic dysfunction (stage I).   Moderate mitral stenosis.   Aortic valve sclerosis.   Minimal tricuspid valve regurgitation.   Mild pulmonary hypertension.    Signature     ---------------------------------------------------------------   Electronically signed by Roberto Martin MD(Interpreting   physician) on 03/03/2022 03:00 PM   ----------------------------------------------------------------

## 2022-03-04 NOTE — DISCHARGE NOTE PROVIDER - NSDCCPCAREPLAN_GEN_ALL_CORE_FT
PRINCIPAL DISCHARGE DIAGNOSIS  Diagnosis: Pain in the chest  Assessment and Plan of Treatment: - chest pain free. This is most likely related to anxiety      SECONDARY DISCHARGE DIAGNOSES  Diagnosis: Atrial fibrillation  Assessment and Plan of Treatment: - c/w tele: now in AFIB, HR controlled  - C/w Cardizem  - Started on Eliquis      Diagnosis: Near syncope  Assessment and Plan of Treatment: - this is most likely secondary to arrhythmias     PRINCIPAL DISCHARGE DIAGNOSIS  Diagnosis: Pain in the chest  Assessment and Plan of Treatment: - chest pain free. This is most likely related to anxiety.      SECONDARY DISCHARGE DIAGNOSES  Diagnosis: Atrial fibrillation  Assessment and Plan of Treatment: - c/w tele: now in AFIB, HR controlled  - C/w Cardizem  - Started on Eliquis      Diagnosis: Near syncope  Assessment and Plan of Treatment: - this is most likely secondary to arrhythmias

## 2022-03-04 NOTE — DISCHARGE NOTE NURSING/CASE MANAGEMENT/SOCIAL WORK - NSDCPEFALRISK_GEN_ALL_CORE
For information on Fall & Injury Prevention, visit: [email or web address] OR  [email or web address] OR  https://www.cdc.gov/steadi/patient.html

## 2022-03-04 NOTE — DISCHARGE NOTE PROVIDER - CARE PROVIDER_API CALL
Nico Gao J  CARDIOVASCULAR DISEASE  [address] [address]  [place] [address]  Phone: [phone]  Fax: [phone]  Follow Up Time:

## 2022-03-10 DIAGNOSIS — Z88.0 ALLERGY STATUS TO PENICILLIN: ICD-10-CM

## 2022-03-10 DIAGNOSIS — G62.9 POLYNEUROPATHY, UNSPECIFIED: ICD-10-CM

## 2022-03-10 DIAGNOSIS — I48.0 PAROXYSMAL ATRIAL FIBRILLATION: ICD-10-CM

## 2022-03-10 DIAGNOSIS — F41.0 PANIC DISORDER [EPISODIC PAROXYSMAL ANXIETY]: ICD-10-CM

## 2022-03-10 DIAGNOSIS — F41.9 ANXIETY DISORDER, UNSPECIFIED: ICD-10-CM

## 2022-03-10 DIAGNOSIS — R07.9 CHEST PAIN, UNSPECIFIED: ICD-10-CM

## 2022-03-10 DIAGNOSIS — Z20.822 CONTACT WITH AND (SUSPECTED) EXPOSURE TO COVID-19: ICD-10-CM

## 2022-03-10 DIAGNOSIS — G57.12 MERALGIA PARESTHETICA, LEFT LOWER LIMB: ICD-10-CM

## 2022-03-10 DIAGNOSIS — I10 ESSENTIAL (PRIMARY) HYPERTENSION: ICD-10-CM

## 2022-03-10 DIAGNOSIS — Z88.8 ALLERGY STATUS TO OTHER DRUGS, MEDICAMENTS AND BIOLOGICAL SUBSTANCES STATUS: ICD-10-CM

## 2022-03-10 DIAGNOSIS — I16.0 HYPERTENSIVE URGENCY: ICD-10-CM

## 2022-03-10 DIAGNOSIS — Z91.018 ALLERGY TO OTHER FOODS: ICD-10-CM

## 2022-03-10 DIAGNOSIS — Z63.4 DISAPPEARANCE AND DEATH OF FAMILY MEMBER: ICD-10-CM

## 2022-03-10 DIAGNOSIS — Z87.891 PERSONAL HISTORY OF NICOTINE DEPENDENCE: ICD-10-CM

## 2022-03-10 DIAGNOSIS — Z91.040 LATEX ALLERGY STATUS: ICD-10-CM

## 2022-03-10 DIAGNOSIS — Z79.01 LONG TERM (CURRENT) USE OF ANTICOAGULANTS: ICD-10-CM

## 2022-03-10 DIAGNOSIS — I42.1 OBSTRUCTIVE HYPERTROPHIC CARDIOMYOPATHY: ICD-10-CM

## 2022-03-10 DIAGNOSIS — Z79.82 LONG TERM (CURRENT) USE OF ASPIRIN: ICD-10-CM

## 2022-03-10 DIAGNOSIS — I71.9 AORTIC ANEURYSM OF UNSPECIFIED SITE, WITHOUT RUPTURE: ICD-10-CM

## 2022-03-10 DIAGNOSIS — R55 SYNCOPE AND COLLAPSE: ICD-10-CM

## 2022-03-10 SDOH — SOCIAL STABILITY - SOCIAL INSECURITY: DISSAPEARANCE AND DEATH OF FAMILY MEMBER: Z63.4

## 2023-09-12 NOTE — ED PROVIDER NOTE - CPE EDP EYES NORM
Immediate Brief Procedure Note    Patient: Zayda Gill    Pre-op Diagnosis: wrist pain    Post-op Diagnosis: Same    Procedure: right wrist arthrogram    Proceduralist: Ailyn Gupta DO    Assistants: none    Anesthesia Staff: [unfilled]    Anesthesia Type: local    Findings: normal    Estimated Blood Loss: none    Complications: none    Specimens Removed: none   normal...

## 2023-11-15 NOTE — ED ADULT NURSE NOTE - NS ED NOTE  TALK SOMEONE YN
Detail Level: Zone Plan: .\\n11/15/23\\nPatient is confused on what he is using. Will print hand out for him again today. Discussed Accutane and they prefer not to take. He did not do well with oral doxy in the past either. Continue Regimen: Metro gel AM use\\nArazlo PM\\n10% BP cleanser in shower\\nBactrim DS for another month Otc Regimen: The Ordinary Azelaic Acid or Kim’s Choice Azelaic Acid in the AM No

## 2024-06-24 ENCOUNTER — EMERGENCY (EMERGENCY)
Facility: HOSPITAL | Age: 84
LOS: 0 days | Discharge: HOME | End: 2024-06-24
Attending: FAMILY MEDICINE
Payer: MEDICARE

## 2024-06-24 VITALS
HEART RATE: 105 BPM | OXYGEN SATURATION: 95 % | TEMPERATURE: 98 F | RESPIRATION RATE: 18 BRPM | HEIGHT: 65 IN | SYSTOLIC BLOOD PRESSURE: 181 MMHG | WEIGHT: 190.04 LBS | DIASTOLIC BLOOD PRESSURE: 70 MMHG

## 2024-06-24 PROBLEM — G62.9 POLYNEUROPATHY, UNSPECIFIED: Chronic | Status: ACTIVE | Noted: 2022-03-02

## 2024-06-24 PROBLEM — G57.12 MERALGIA PARESTHETICA, LEFT LOWER LIMB: Chronic | Status: ACTIVE | Noted: 2022-03-02

## 2024-06-24 PROBLEM — M54.50 LOW BACK PAIN, UNSPECIFIED: Chronic | Status: ACTIVE | Noted: 2022-03-02

## 2024-06-24 PROBLEM — I47.1 SUPRAVENTRICULAR TACHYCARDIA: Chronic | Status: ACTIVE | Noted: 2022-03-02

## 2024-06-24 PROCEDURE — 99285 EMERGENCY DEPT VISIT HI MDM: CPT

## 2024-06-24 PROCEDURE — 99282 EMERGENCY DEPT VISIT SF MDM: CPT

## 2024-06-24 RX ORDER — SODIUM CHLORIDE 9 MG/ML
3 INJECTION INTRAMUSCULAR; INTRAVENOUS; SUBCUTANEOUS ONCE
Refills: 0 | Status: DISCONTINUED | OUTPATIENT
Start: 2024-06-24 | End: 2024-06-24

## 2024-06-24 NOTE — ED PROVIDER NOTE - CLINICAL SUMMARY MEDICAL DECISION MAKING FREE TEXT BOX
Pt with hx of decubitus ulcer for 1.5 years here with bleeding from the area after bandage was removed. Bleeding is controlled now. Wound does not appear infected. Pt needs wound care. Will clean and dress wound and refer to wound care clinic.

## 2024-06-24 NOTE — ED ADULT NURSE NOTE - NSFALLRISKINTERV_ED_ALL_ED

## 2024-06-24 NOTE — CHART NOTE - NSCHARTNOTEFT_GEN_A_CORE
Pt is a 82 y/o female who presented to the ER for wound check to buttock x 1 year.  Pt reports wound re opened on Thursday.  Pt had intermittent bleeding.  Pt takes Eliquis.  Sw was asked to see pt to assist in setting up wound care at home and at the would care center.  ANDREIA met with pt and her dtr Bailey [phone] who was at bedside.  Pt has two dtr that live with her.  Pt uses a cane, R/w or w/c for mobility and lives in a split level home with 1 HAMZAH.  Pt manages her own ADLS.  Pt dtr had been assisting pt with wound care and when she took the bandage off the scab came off with it and it would not stop bleeding.  Pt reports she goes to Dr. oJno Gao.  Pt was evaluated in ER and will require wound care at home.  Andreia informed CM who will make referral to home care for wound care.  Sw will also make referral to wound care center.  ANDREIA/CM will follow up with pt tomorrow to see who has accepted pt case and when wound care center is open to make an appointment. Pt will be d/c home today,  Case discussed with RN/MD,

## 2024-06-24 NOTE — ED PROVIDER NOTE - NSICDXPASTMEDICALHX_GEN_ALL_CORE_FT
PAST MEDICAL HISTORY:  Anxiety     Chronic lower back pain     HTN (hypertension)     Meralgia paresthetica, left lower limb     Multifocal atrial tachycardia     Peripheral neuropathy

## 2024-06-24 NOTE — ED PROVIDER NOTE - OBJECTIVE STATEMENT
84 y/o F with PMHx of multifocal atrial tachycardia, chronic lower back pain, meralgia paresthetica, peripheral neuropathy, HTN, anxiety presents to the ED c/o wound to buttock x1.5 years that reopened x4 days ago. Endorses intermittent bleeding from wound x4 days. Bandage removed today at bedside by friend and wound started bleeding profusely. Pressure was applied to stop the bleeding. Pt seen by dermatologist and told it was a decubitus ulcer.

## 2024-06-24 NOTE — ED ADULT TRIAGE NOTE - CHIEF COMPLAINT QUOTE
States she has wound to buttock x1 year that reopened on Thursday. Has been having intermittent bleeding from wound for 5 days. States PTA, heavy bleeding was noted from wound, soaked through dressing and pants. Takes Eliquis daily. Denies any complaints of pain, lightheaded or dizziness. VS stable at triage.

## 2024-06-24 NOTE — ED PROVIDER NOTE - PROVIDER TOKENS
FREE:[LAST:[[facility]],PHONE:[phone]],FAX:[(   )    -],ADDRESS:[[facility]]]

## 2024-06-24 NOTE — ED ADULT NURSE NOTE - OBJECTIVE STATEMENT
pt presents to the ED with stage 2 wound on her buttocks. pt reports she has had this wound for about a year now and her daughter helps her to keep it clean and dry. reports it started bleeding and has not stopped. reports she take Eliquis daily and was concerned about the bleeding. pt A&Ox4 and well appearing with no further complaints or discomforts reported at this time.

## 2024-06-24 NOTE — ED PROVIDER NOTE - NSFOLLOWUPINSTRUCTIONS_ED_ALL_ED_FT
Pt requires homecare discussed with family. Please clean wound with saline and apply Alleyvan dressing to ulcer every 24 hours. Return to ER if worse.

## 2024-06-24 NOTE — ED PROVIDER NOTE - CARE PROVIDER_API CALL
[facility],   [facility]  Phone: [phone]  Fax: (   )    -  Follow Up Time:

## 2024-06-24 NOTE — ED PROVIDER NOTE - SKIN, MLM
Skin normal color for race, warm, dry and intact. No evidence of rash. two 1cm stage 2 decubitus ulcers, one on each side of the gluteal fold

## 2024-06-24 NOTE — ED PROVIDER NOTE - NSICDXPASTSURGICALHX_GEN_ALL_CORE_FT
Appointment was scheduled. Please refil ASAP   PAST SURGICAL HISTORY:  No significant past surgical history

## 2024-06-24 NOTE — ED ADULT NURSE REASSESSMENT NOTE - NS ED NURSE REASSESS COMMENT FT1
pt has wound on buttocks. alleyvn placed. told to change dressing Q24hrs and clean with saline. pt and family verbalize understanding

## 2024-06-24 NOTE — ED PROVIDER NOTE - PATIENT PORTAL LINK FT
You can access the FollowMyHealth Patient Portal offered by [facility] by registering at the following website: [email or web address] By joining [employer]’s FollowMyHealth portal, you will also be able to view your health information using other applications (apps) compatible with our system.

## 2024-07-16 ENCOUNTER — OUTPATIENT (OUTPATIENT)
Dept: OUTPATIENT SERVICES | Facility: HOSPITAL | Age: 84
LOS: 1 days | End: 2024-07-16

## 2024-07-16 DIAGNOSIS — S31.809A UNSPECIFIED OPEN WOUND OF UNSPECIFIED BUTTOCK, INITIAL ENCOUNTER: ICD-10-CM

## 2024-07-16 PROCEDURE — 99202 OFFICE O/P NEW SF 15 MIN: CPT

## 2024-07-17 DIAGNOSIS — I10 ESSENTIAL (PRIMARY) HYPERTENSION: ICD-10-CM

## 2024-07-17 DIAGNOSIS — G90.09 OTHER IDIOPATHIC PERIPHERAL AUTONOMIC NEUROPATHY: ICD-10-CM

## 2024-07-17 DIAGNOSIS — I47.10 SUPRAVENTRICULAR TACHYCARDIA, UNSPECIFIED: ICD-10-CM

## 2024-07-17 DIAGNOSIS — L89.316 PRESSURE-INDUCED DEEP TISSUE DAMAGE OF RIGHT BUTTOCK: ICD-10-CM

## 2024-07-23 NOTE — ED PROVIDER NOTE - NS ED SCRIBE STATEMENT
Patient with known Cirrhosis. Co-morbidities are present and inclusive of ascites, esophageal varices, and thrombocytopenia .  MELD-Na score calculated; MELD 3.0: 11 at 7/5/2024 11:43 AM  MELD-Na: 10 at 7/5/2024 11:43 AM  Calculated from:  Serum Creatinine: 0.7 mg/dL (Using min of 1 mg/dL) at 7/5/2024 11:43 AM  Serum Sodium: 141 mmol/L (Using max of 137 mmol/L) at 7/5/2024 11:43 AM  Total Bilirubin: 1.4 mg/dL at 7/5/2024 11:43 AM  Serum Albumin: 3.3 g/dL at 7/5/2024 11:43 AM  INR(ratio): 1.2 at 7/5/2024 11:43 AM  Age at listing (hypothetical): 67 years  Sex: Female at 7/5/2024 11:43 AM    Continue chronic meds: Lasix for ascites/fluid management. 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 Will avoid any hepatotoxic meds, and monitor CBC/CMP/INR for synthetic function.   Follows with hepatologist   Attending

## 2025-04-13 ENCOUNTER — EMERGENCY (EMERGENCY)
Facility: HOSPITAL | Age: 85
LOS: 0 days | Discharge: HOME | End: 2025-04-13
Attending: STUDENT IN AN ORGANIZED HEALTH CARE EDUCATION/TRAINING PROGRAM
Payer: MEDICARE

## 2025-04-13 VITALS
DIASTOLIC BLOOD PRESSURE: 61 MMHG | HEART RATE: 100 BPM | WEIGHT: 184.09 LBS | TEMPERATURE: 98 F | HEIGHT: 67 IN | RESPIRATION RATE: 16 BRPM | OXYGEN SATURATION: 94 % | SYSTOLIC BLOOD PRESSURE: 163 MMHG

## 2025-04-13 DIAGNOSIS — I10 ESSENTIAL (PRIMARY) HYPERTENSION: ICD-10-CM

## 2025-04-13 DIAGNOSIS — Z88.0 ALLERGY STATUS TO PENICILLIN: ICD-10-CM

## 2025-04-13 DIAGNOSIS — Z91.040 LATEX ALLERGY STATUS: ICD-10-CM

## 2025-04-13 DIAGNOSIS — M54.50 LOW BACK PAIN, UNSPECIFIED: ICD-10-CM

## 2025-04-13 DIAGNOSIS — G89.29 OTHER CHRONIC PAIN: ICD-10-CM

## 2025-04-13 DIAGNOSIS — Z88.8 ALLERGY STATUS TO OTHER DRUGS, MEDICAMENTS AND BIOLOGICAL SUBSTANCES: ICD-10-CM

## 2025-04-13 DIAGNOSIS — Z91.018 ALLERGY TO OTHER FOODS: ICD-10-CM

## 2025-04-13 PROCEDURE — 72131 CT LUMBAR SPINE W/O DYE: CPT | Mod: MC

## 2025-04-13 PROCEDURE — 96374 THER/PROPH/DIAG INJ IV PUSH: CPT

## 2025-04-13 PROCEDURE — 72192 CT PELVIS W/O DYE: CPT | Mod: 26

## 2025-04-13 PROCEDURE — 99285 EMERGENCY DEPT VISIT HI MDM: CPT

## 2025-04-13 PROCEDURE — 72192 CT PELVIS W/O DYE: CPT | Mod: MC

## 2025-04-13 PROCEDURE — 72131 CT LUMBAR SPINE W/O DYE: CPT | Mod: 26

## 2025-04-13 PROCEDURE — 76376 3D RENDER W/INTRP POSTPROCES: CPT | Mod: 26

## 2025-04-13 PROCEDURE — 96375 TX/PRO/DX INJ NEW DRUG ADDON: CPT

## 2025-04-13 PROCEDURE — 76376 3D RENDER W/INTRP POSTPROCES: CPT

## 2025-04-13 PROCEDURE — 99284 EMERGENCY DEPT VISIT MOD MDM: CPT | Mod: 25

## 2025-04-13 PROCEDURE — 96376 TX/PRO/DX INJ SAME DRUG ADON: CPT

## 2025-04-13 RX ORDER — DIAZEPAM 2 MG/1
5 TABLET ORAL ONCE
Refills: 0 | Status: DISCONTINUED | OUTPATIENT
Start: 2025-04-13 | End: 2025-04-13

## 2025-04-13 RX ORDER — OXYCODONE HYDROCHLORIDE AND ACETAMINOPHEN 10; 325 MG/1; MG/1
1 TABLET ORAL
Qty: 6 | Refills: 0
Start: 2025-04-13 | End: 2025-04-14

## 2025-04-13 RX ORDER — METHOCARBAMOL 500 MG/1
750 TABLET, FILM COATED ORAL ONCE
Refills: 0 | Status: COMPLETED | OUTPATIENT
Start: 2025-04-13 | End: 2025-04-13

## 2025-04-13 RX ORDER — METHOCARBAMOL 500 MG/1
1 TABLET, FILM COATED ORAL
Qty: 12 | Refills: 0
Start: 2025-04-13 | End: 2025-04-16

## 2025-04-13 RX ORDER — KETOROLAC TROMETHAMINE 30 MG/ML
15 INJECTION, SOLUTION INTRAMUSCULAR; INTRAVENOUS ONCE
Refills: 0 | Status: DISCONTINUED | OUTPATIENT
Start: 2025-04-13 | End: 2025-04-13

## 2025-04-13 RX ORDER — IBUPROFEN 200 MG
1 TABLET ORAL
Qty: 16 | Refills: 0
Start: 2025-04-13 | End: 2025-04-16

## 2025-04-13 RX ADMIN — Medication 2 MILLIGRAM(S): at 20:13

## 2025-04-13 RX ADMIN — METHOCARBAMOL 750 MILLIGRAM(S): 500 TABLET, FILM COATED ORAL at 17:42

## 2025-04-13 RX ADMIN — DIAZEPAM 5 MILLIGRAM(S): 2 TABLET ORAL at 20:13

## 2025-04-13 RX ADMIN — KETOROLAC TROMETHAMINE 15 MILLIGRAM(S): 30 INJECTION, SOLUTION INTRAMUSCULAR; INTRAVENOUS at 17:44

## 2025-04-13 RX ADMIN — Medication 2 MILLIGRAM(S): at 17:45

## 2025-04-13 NOTE — ED ADULT NURSE NOTE - OBJECTIVE STATEMENT
pt presents to ed c/o lower back pain. pt states the pain is unbearable and she has been having a hard time ambulating. pt walks with cane at baseline. denies falls or traumas to back. 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 no further complaints at this time

## 2025-04-13 NOTE — ED PROVIDER NOTE - OBJECTIVE STATEMENT
Pt is an 84y female w/ a PMHx of HTN and anxiety who presents to the ED BIBEMS from home c/o atraumatic, acute on chronic, worsening lower back pain for the last week. 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 Today, the patient was unable to get out of her recliner d/t the pain. States that her pain is aggravated by ambulation. Used to see a chiropractor for her pain but reports he passed away. Tries to manage the pain with medications but has not gotten any relief. Denies PSHx to the back. Denies CP, SOB, n/v/d, constipation, urinary or bowel incontinence, numbness, or tingling.

## 2025-04-13 NOTE — ED PROVIDER NOTE - PHYSICAL EXAMINATION
General: Patient in no acute distress, AAOX3.   HENMT: NC/AT, no nasal congestion, MMM  Neck: supple  CVS: regular rate and rhythm, no murmur  Resp: Good air entry bilaterally, No wheeze/rhonchi.  Abd: Soft non tender, non distended, +bowel sounds, No guarding, rebound tenderness   Ext: FROM in all ext, 2+ pulses throughout  BACK: R paralumbar tenderness, no midline tenderness, no stepoffs, no CVA ttp  NEURO: no focal deficit, gross motor and sensory intact throughout, gait stable.

## 2025-04-13 NOTE — ED PROVIDER NOTE - CLINICAL SUMMARY MEDICAL DECISION MAKING FREE TEXT BOX
84y female w/ a PMHx of HTN, anxiety, and chronic lower back pain who presents to the ED BIBEMS from home c/o atraumatic, acute on chronic, worsening lower back pain for the last week. Will r/o any fracture, though low suspicion. Plan for imaging, pain control, and re-evaluate.

## 2025-04-13 NOTE — ED ADULT NURSE NOTE - NSFALLHARMRISKINTERV_ED_ALL_ED

## 2025-04-13 NOTE — ED ADULT TRIAGE NOTE - CHIEF COMPLAINT QUOTE
biba from home with c/o lower back pain, unable to ambulate. hx chronic lumbar spine pain. denies falls or injuries, tingling or numbness, loss of urine or bowel control.

## 2025-04-13 NOTE — ED ADULT NURSE REASSESSMENT NOTE - NS ED NURSE REASSESS COMMENT FT1
pt states pain is not gone but is much better. pt ambulatory with cane; steady gait. MD Mays observed.

## 2025-04-13 NOTE — ED PROVIDER NOTE - PROGRESS NOTE DETAILS
Pt states that symptoms have improved. Pt requesting to go home.    back pain has resolved. ambulating well with stable gait with cane , at baseline.   Work-up negative for acute pathology in ED.   Signs, symptoms, results were reviewed with patient.  Pt agreed to return if symptoms return and/or worsen.  Pt agrees to follow up with PMD. Red Flags discussed with patient. Patient understands to follow-up and take medications as prescribed. All this was discussed in layman's terms with the patient.

## 2025-04-13 NOTE — ED PROVIDER NOTE - PATIENT PORTAL LINK FT
You can access the FollowMyHealth Patient Portal offered by [facility] by registering at the following website: [email or web address]. By joining "[employer]’s FollowMyHealth portal, you will also be able to view your health information using other applications (apps) compatible with our system.

## 2025-04-24 ENCOUNTER — APPOINTMENT (OUTPATIENT)
Dept: NEUROSURGERY | Facility: CLINIC | Age: 85
End: 2025-04-24
Payer: MEDICARE

## 2025-04-24 VITALS
SYSTOLIC BLOOD PRESSURE: 155 MMHG | OXYGEN SATURATION: 96 % | BODY MASS INDEX: 29.99 KG/M2 | HEIGHT: 65 IN | WEIGHT: 180 LBS | HEART RATE: 92 BPM | DIASTOLIC BLOOD PRESSURE: 76 MMHG

## 2025-04-24 DIAGNOSIS — R93.7 ABNORMAL FINDINGS ON DIAGNOSTIC IMAGING OF OTHER PARTS OF MUSCULOSKELETAL SYSTEM: ICD-10-CM

## 2025-04-24 DIAGNOSIS — M62.830 MUSCLE SPASM OF BACK: ICD-10-CM

## 2025-04-24 PROCEDURE — 99204 OFFICE O/P NEW MOD 45 MIN: CPT

## 2025-04-24 RX ORDER — METHOCARBAMOL 500 MG/1
500 TABLET, FILM COATED ORAL EVERY 6 HOURS
Qty: 60 | Refills: 0 | Status: ACTIVE | COMMUNITY
Start: 2025-04-24 | End: 1900-01-01

## 2025-06-26 ENCOUNTER — APPOINTMENT (OUTPATIENT)
Dept: NEUROSURGERY | Facility: CLINIC | Age: 85
End: 2025-06-26
Payer: MEDICARE

## 2025-06-26 VITALS — SYSTOLIC BLOOD PRESSURE: 143 MMHG | HEART RATE: 93 BPM | OXYGEN SATURATION: 95 % | DIASTOLIC BLOOD PRESSURE: 74 MMHG

## 2025-06-26 PROCEDURE — 99214 OFFICE O/P EST MOD 30 MIN: CPT

## 2025-06-26 RX ORDER — CYCLOBENZAPRINE HYDROCHLORIDE 5 MG/1
5 TABLET, FILM COATED ORAL DAILY
Qty: 30 | Refills: 0 | Status: ACTIVE | COMMUNITY
Start: 2025-06-26 | End: 1900-01-01

## 2025-07-11 ENCOUNTER — APPOINTMENT (OUTPATIENT)
Dept: RHEUMATOLOGY | Facility: CLINIC | Age: 85
End: 2025-07-11
Payer: MEDICARE

## 2025-07-11 ENCOUNTER — EMERGENCY (EMERGENCY)
Facility: HOSPITAL | Age: 85
LOS: 0 days | Discharge: HOME | End: 2025-07-11
Attending: STUDENT IN AN ORGANIZED HEALTH CARE EDUCATION/TRAINING PROGRAM
Payer: MEDICARE

## 2025-07-11 VITALS
TEMPERATURE: 98 F | SYSTOLIC BLOOD PRESSURE: 169 MMHG | OXYGEN SATURATION: 98 % | DIASTOLIC BLOOD PRESSURE: 60 MMHG | HEART RATE: 99 BPM | RESPIRATION RATE: 18 BRPM

## 2025-07-11 VITALS
BODY MASS INDEX: 29.99 KG/M2 | DIASTOLIC BLOOD PRESSURE: 83 MMHG | OXYGEN SATURATION: 96 % | SYSTOLIC BLOOD PRESSURE: 176 MMHG | HEART RATE: 117 BPM | WEIGHT: 180 LBS | HEIGHT: 65 IN

## 2025-07-11 VITALS — HEIGHT: 66 IN | WEIGHT: 182.1 LBS

## 2025-07-11 DIAGNOSIS — R00.0 TACHYCARDIA, UNSPECIFIED: ICD-10-CM

## 2025-07-11 DIAGNOSIS — Z91.040 LATEX ALLERGY STATUS: ICD-10-CM

## 2025-07-11 DIAGNOSIS — Z88.0 ALLERGY STATUS TO PENICILLIN: ICD-10-CM

## 2025-07-11 DIAGNOSIS — I10 ESSENTIAL (PRIMARY) HYPERTENSION: ICD-10-CM

## 2025-07-11 DIAGNOSIS — G89.29 OTHER CHRONIC PAIN: ICD-10-CM

## 2025-07-11 DIAGNOSIS — M54.50 LOW BACK PAIN, UNSPECIFIED: ICD-10-CM

## 2025-07-11 DIAGNOSIS — Z91.018 ALLERGY TO OTHER FOODS: ICD-10-CM

## 2025-07-11 DIAGNOSIS — Z88.8 ALLERGY STATUS TO OTHER DRUGS, MEDICAMENTS AND BIOLOGICAL SUBSTANCES: ICD-10-CM

## 2025-07-11 DIAGNOSIS — Z79.01 LONG TERM (CURRENT) USE OF ANTICOAGULANTS: ICD-10-CM

## 2025-07-11 LAB
ALBUMIN SERPL ELPH-MCNC: 3.7 G/DL — SIGNIFICANT CHANGE UP (ref 3.3–5)
ALP SERPL-CCNC: 100 U/L — SIGNIFICANT CHANGE UP (ref 40–120)
ALT FLD-CCNC: 24 U/L — SIGNIFICANT CHANGE UP (ref 12–78)
ANION GAP SERPL CALC-SCNC: 8 MMOL/L — SIGNIFICANT CHANGE UP (ref 5–17)
APTT BLD: 36.9 SEC — HIGH (ref 26.1–36.8)
AST SERPL-CCNC: 17 U/L — SIGNIFICANT CHANGE UP (ref 15–37)
BASOPHILS # BLD AUTO: 0.04 K/UL — SIGNIFICANT CHANGE UP (ref 0–0.2)
BASOPHILS NFR BLD AUTO: 0.4 % — SIGNIFICANT CHANGE UP (ref 0–2)
BILIRUB SERPL-MCNC: 0.8 MG/DL — SIGNIFICANT CHANGE UP (ref 0.2–1.2)
BLD GP AB SCN SERPL QL: SIGNIFICANT CHANGE UP
BUN SERPL-MCNC: 9 MG/DL — SIGNIFICANT CHANGE UP (ref 7–23)
CALCIUM SERPL-MCNC: 9.2 MG/DL — SIGNIFICANT CHANGE UP (ref 8.5–10.1)
CHLORIDE SERPL-SCNC: 103 MMOL/L — SIGNIFICANT CHANGE UP (ref 96–108)
CO2 SERPL-SCNC: 27 MMOL/L — SIGNIFICANT CHANGE UP (ref 22–31)
CREAT SERPL-MCNC: 0.71 MG/DL — SIGNIFICANT CHANGE UP (ref 0.5–1.3)
EGFR: 83 ML/MIN/1.73M2 — SIGNIFICANT CHANGE UP
EGFR: 83 ML/MIN/1.73M2 — SIGNIFICANT CHANGE UP
EOSINOPHIL # BLD AUTO: 0.03 K/UL — SIGNIFICANT CHANGE UP (ref 0–0.5)
EOSINOPHIL NFR BLD AUTO: 0.3 % — SIGNIFICANT CHANGE UP (ref 0–6)
GLUCOSE SERPL-MCNC: 132 MG/DL — HIGH (ref 70–99)
HCT VFR BLD CALC: 42.6 % — SIGNIFICANT CHANGE UP (ref 34.5–45)
HGB BLD-MCNC: 13 G/DL — SIGNIFICANT CHANGE UP (ref 11.5–15.5)
IMM GRANULOCYTES # BLD AUTO: 0.04 K/UL — SIGNIFICANT CHANGE UP (ref 0–0.07)
IMM GRANULOCYTES NFR BLD AUTO: 0.4 % — SIGNIFICANT CHANGE UP (ref 0–0.9)
INR BLD: 1.65 RATIO — HIGH (ref 0.85–1.16)
LYMPHOCYTES # BLD AUTO: 0.7 K/UL — LOW (ref 1–3.3)
LYMPHOCYTES NFR BLD AUTO: 7.7 % — LOW (ref 13–44)
MCHC RBC-ENTMCNC: 22.3 PG — LOW (ref 27–34)
MCHC RBC-ENTMCNC: 30.5 G/DL — LOW (ref 32–36)
MCV RBC AUTO: 72.9 FL — LOW (ref 80–100)
MONOCYTES # BLD AUTO: 0.66 K/UL — SIGNIFICANT CHANGE UP (ref 0–0.9)
MONOCYTES NFR BLD AUTO: 7.2 % — SIGNIFICANT CHANGE UP (ref 2–14)
NEUTROPHILS # BLD AUTO: 7.67 K/UL — HIGH (ref 1.8–7.4)
NEUTROPHILS NFR BLD AUTO: 84 % — HIGH (ref 43–77)
NRBC # BLD AUTO: 0 K/UL — SIGNIFICANT CHANGE UP (ref 0–0)
NRBC # FLD: 0 K/UL — SIGNIFICANT CHANGE UP (ref 0–0)
NRBC BLD AUTO-RTO: 0 /100 WBCS — SIGNIFICANT CHANGE UP (ref 0–0)
PLATELET # BLD AUTO: 304 K/UL — SIGNIFICANT CHANGE UP (ref 150–400)
PMV BLD: 9.2 FL — SIGNIFICANT CHANGE UP (ref 7–13)
POTASSIUM SERPL-MCNC: 3.5 MMOL/L — SIGNIFICANT CHANGE UP (ref 3.5–5.3)
POTASSIUM SERPL-SCNC: 3.5 MMOL/L — SIGNIFICANT CHANGE UP (ref 3.5–5.3)
PROT SERPL-MCNC: 8.3 GM/DL — SIGNIFICANT CHANGE UP (ref 6–8.3)
PROTHROM AB SERPL-ACNC: 18.9 SEC — HIGH (ref 9.9–13.4)
RBC # BLD: 5.84 M/UL — HIGH (ref 3.8–5.2)
RBC # FLD: 16 % — HIGH (ref 10.3–14.5)
SODIUM SERPL-SCNC: 138 MMOL/L — SIGNIFICANT CHANGE UP (ref 135–145)
TROPONIN I, HIGH SENSITIVITY RESULT: 9.41 NG/L — SIGNIFICANT CHANGE UP
WBC # BLD: 9.14 K/UL — SIGNIFICANT CHANGE UP (ref 3.8–10.5)
WBC # FLD AUTO: 9.14 K/UL — SIGNIFICANT CHANGE UP (ref 3.8–10.5)

## 2025-07-11 PROCEDURE — 71275 CT ANGIOGRAPHY CHEST: CPT

## 2025-07-11 PROCEDURE — 85610 PROTHROMBIN TIME: CPT

## 2025-07-11 PROCEDURE — 99285 EMERGENCY DEPT VISIT HI MDM: CPT | Mod: 25

## 2025-07-11 PROCEDURE — 85025 COMPLETE CBC W/AUTO DIFF WBC: CPT

## 2025-07-11 PROCEDURE — 36415 COLL VENOUS BLD VENIPUNCTURE: CPT

## 2025-07-11 PROCEDURE — 84484 ASSAY OF TROPONIN QUANT: CPT

## 2025-07-11 PROCEDURE — 86901 BLOOD TYPING SEROLOGIC RH(D): CPT

## 2025-07-11 PROCEDURE — 80053 COMPREHEN METABOLIC PANEL: CPT

## 2025-07-11 PROCEDURE — 93005 ELECTROCARDIOGRAM TRACING: CPT

## 2025-07-11 PROCEDURE — 86900 BLOOD TYPING SEROLOGIC ABO: CPT

## 2025-07-11 PROCEDURE — 99204 OFFICE O/P NEW MOD 45 MIN: CPT

## 2025-07-11 PROCEDURE — 85730 THROMBOPLASTIN TIME PARTIAL: CPT

## 2025-07-11 PROCEDURE — 99285 EMERGENCY DEPT VISIT HI MDM: CPT

## 2025-07-11 PROCEDURE — 93010 ELECTROCARDIOGRAM REPORT: CPT

## 2025-07-11 PROCEDURE — 74174 CTA ABD&PLVS W/CONTRAST: CPT | Mod: 26

## 2025-07-11 PROCEDURE — 71275 CT ANGIOGRAPHY CHEST: CPT | Mod: 26

## 2025-07-11 PROCEDURE — G2211 COMPLEX E/M VISIT ADD ON: CPT

## 2025-07-11 PROCEDURE — 74174 CTA ABD&PLVS W/CONTRAST: CPT

## 2025-07-11 PROCEDURE — 86850 RBC ANTIBODY SCREEN: CPT

## 2025-07-11 RX ORDER — DILTIAZEM HYDROCHLORIDE 180 MG/1
180 CAPSULE, EXTENDED RELEASE ORAL
Refills: 0 | Status: ACTIVE | COMMUNITY

## 2025-07-11 RX ORDER — AMLODIPINE BESYLATE 5 MG/1
5 TABLET ORAL
Refills: 0 | Status: ACTIVE | COMMUNITY

## 2025-07-11 RX ORDER — METHOCARBAMOL 500 MG/1
500 TABLET, FILM COATED ORAL
Refills: 0 | Status: ACTIVE | COMMUNITY

## 2025-07-11 RX ORDER — ALPRAZOLAM 0.5 MG
0.25 TABLET, EXTENDED RELEASE 24 HR ORAL ONCE
Refills: 0 | Status: DISCONTINUED | OUTPATIENT
Start: 2025-07-11 | End: 2025-07-11

## 2025-07-11 RX ORDER — APIXABAN 5 MG/1
5 TABLET, FILM COATED ORAL
Refills: 0 | Status: ACTIVE | COMMUNITY

## 2025-07-11 RX ORDER — LIDOCAINE HYDROCHLORIDE 20 MG/ML
1 JELLY TOPICAL ONCE
Refills: 0 | Status: COMPLETED | OUTPATIENT
Start: 2025-07-11 | End: 2025-07-11

## 2025-07-11 RX ORDER — CYANOCOBALAMIN (VITAMIN B-12) 1000 MCG
1000 TABLET ORAL
Refills: 0 | Status: ACTIVE | COMMUNITY

## 2025-07-11 RX ORDER — CYCLOBENZAPRINE HYDROCHLORIDE 5 MG/1
5 TABLET, FILM COATED ORAL
Refills: 0 | Status: ACTIVE | COMMUNITY

## 2025-07-11 RX ORDER — LOSARTAN POTASSIUM 100 MG/1
100 TABLET, FILM COATED ORAL
Refills: 0 | Status: ACTIVE | COMMUNITY

## 2025-07-11 RX ORDER — MULTIVIT-MIN/FOLIC/VIT K/LYCOP 400-300MCG
50 MCG TABLET ORAL
Refills: 0 | Status: ACTIVE | COMMUNITY

## 2025-07-11 RX ORDER — ACETAMINOPHEN 500 MG/5ML
975 LIQUID (ML) ORAL ONCE
Refills: 0 | Status: COMPLETED | OUTPATIENT
Start: 2025-07-11 | End: 2025-07-11

## 2025-07-11 RX ORDER — ONDANSETRON 4 MG/1
4 TABLET, ORALLY DISINTEGRATING ORAL
Refills: 0 | Status: ACTIVE | COMMUNITY

## 2025-07-11 RX ADMIN — Medication 0.25 MILLIGRAM(S): at 14:54

## 2025-07-11 RX ADMIN — LIDOCAINE HYDROCHLORIDE 1 PATCH: 20 JELLY TOPICAL at 14:54

## 2025-07-11 RX ADMIN — Medication 975 MILLIGRAM(S): at 14:54

## 2025-07-11 NOTE — ED ADULT NURSE NOTE - OBJECTIVE STATEMENT
pt presents to the ED for back pain. reports the pain is related to atraumatic diffuse back pain and spasms since April. as per pt pain unrelieved by Flexeril and Robaxin. endorses associated nausea and vomiting. pt well appearing with no other complaints or discomforts reported at this time

## 2025-07-11 NOTE — ED PROVIDER NOTE - CLINICAL SUMMARY MEDICAL DECISION MAKING FREE TEXT BOX
84-year-old female with a history of chronic lower back pain, hypertension, on Eliquis for A-fib?,  Presenting with right upper sided back pain intermittent.  Patient is a poor historian and also is hard of hearing.  Daughter reports that patient was screaming at with pain yesterday.  Her typical pain is in her lower back.  Patient is denying chest pain nausea vomiting diaphoresis.  Patient's pain is unrelieved by Flexeril or Robaxin.  Patient has seen a spine doctor who recommended she see pain management and rheumatology.  Had an appointment today with rheumatology rheumatologist referred her to the ED.  Patient has an upcoming appointment with [facility] with pain management in 2 weeks.  Patient has not taking any Tylenol today.  Patient denies any history of cancer, incontinence, numbness or tingling, IV drug use, saddle anesthesia.  Denies any fevers.  Denies any falls.    Patient is hypertensive and slightly tachycardic but has a history of multifocal atrial tachycardia  On exam patient patient indicates right upper thoracic back pain.  Skin is normal.  Lung sounds are normal.  Pulses are 2+ in the distal extremity no numbness or tingling.    Differential includes dissection, pneumonia, musculoskeletal.    CT, labs, pain control, ekg 84-year-old female with a history of chronic lower back pain, hypertension, on Eliquis for A-fib?,  Presenting with right upper sided back pain intermittent x months. Patient is a poor historian and also is hard of hearing.  Daughter reports that patient was screaming with pain yesterday.  Her typical pain is in her lower back.  Patient is denying chest pain nausea vomiting diaphoresis.  Patient's pain is unrelieved by Flexeril or Robaxin.  Patient has seen a spine doctor who recommended she see pain management and rheumatology.  Had an appointment today with rheumatology, rheumatologist referred her to the ED.  Patient has an upcoming appointment with pain management in 2 weeks.  Patient has not taking any Tylenol today.  Patient denies any history of cancer, incontinence, numbness or tingling, IV drug use, saddle anesthesia.  Denies any fevers.  Denies any falls.    Patient is hypertensive and slightly tachycardic but has a history of multifocal atrial tachycardia and appears comfortable  On exam patient indicates right upper thoracic back pain.  Skin is normal.  Lung sounds are normal.  Pulses are 2+ in the distal extremity no numbness or tingling.    Differential includes dissection, pneumonia, musculoskeletal.    CT, labs, pain control, ekg

## 2025-07-11 NOTE — ED PROVIDER NOTE - NSFOLLOWUPINSTRUCTIONS_ED_ALL_ED_FT
Your labs and CT showed no acute abnormalities.    Please follow-up with your PCP regarding incidental CT findings.    Return to the Emergency Department for worsening or persistent symptoms, and/or ANY NEW OR CONCERNING SYMPTOMS. If you have issues obtaining follow up, please call: [phone] or [phone]  to obtain a doctor or specialist who takes your insurance in your area. Your labs and CT showed no acute abnormalities.  For pain, Take tylenol 1000 mg every 8 hours as needed for pain. Do not take more than 3000 mg in a 24 hour period.      Please follow-up with your PCP regarding incidental CT findings.    Return to the Emergency Department for worsening or persistent symptoms, and/or ANY NEW OR CONCERNING SYMPTOMS. If you have issues obtaining follow up, please call: [phone] or [phone]  to obtain a doctor or specialist who takes your insurance in your area.

## 2025-07-11 NOTE — ED PROVIDER NOTE - PHYSICAL EXAMINATION
Constitutional: NAD, alert, verbal  Cardiac: tachycardia  Resp: clear, no wheezing or crackles  GI: ab soft ntnd, no r/g  MSK/Ext: FULL ROM of both low ext, sensation intact, DP pulse 2+  Neuro: ODONNELL

## 2025-07-11 NOTE — ED ADULT NURSE NOTE - NSFALLHARMRISKINTERV_ED_ALL_ED

## 2025-07-11 NOTE — ED ADULT TRIAGE NOTE - CHIEF COMPLAINT QUOTE
Pt c/o atraumatic diffuse back pain and spasms since April. Pain unrelieved by Flexeril and Robaxin. Now starting to vomit x2 days.

## 2025-07-11 NOTE — ED PROVIDER NOTE - PATIENT PORTAL LINK FT
You can access the FollowMyHealth Patient Portal offered by [facility] by registering at the following website: [email or web address]. By joining [employer]’s FollowMyHealth portal, you will also be able to view your health information using other applications (apps) compatible with our system.

## 2025-07-11 NOTE — ED PROVIDER NOTE - PROGRESS NOTE DETAILS
patient labs and imaging wnl. well-appearing pain improved. patient informed of incidental findings. will f/u with pcp. has pain management appt in 2 weeks. instructed to take tylenol

## 2025-07-17 ENCOUNTER — APPOINTMENT (OUTPATIENT)
Dept: PHYSICAL MEDICINE AND REHAB | Facility: CLINIC | Age: 85
End: 2025-07-17

## 2025-07-17 VITALS
DIASTOLIC BLOOD PRESSURE: 77 MMHG | SYSTOLIC BLOOD PRESSURE: 148 MMHG | HEIGHT: 65 IN | RESPIRATION RATE: 16 BRPM | HEART RATE: 79 BPM | OXYGEN SATURATION: 94 % | BODY MASS INDEX: 29.99 KG/M2 | WEIGHT: 180 LBS

## 2025-07-17 VITALS
DIASTOLIC BLOOD PRESSURE: 89 MMHG | RESPIRATION RATE: 16 BRPM | HEART RATE: 89 BPM | HEIGHT: 65 IN | WEIGHT: 178 LBS | OXYGEN SATURATION: 97 % | BODY MASS INDEX: 29.66 KG/M2 | SYSTOLIC BLOOD PRESSURE: 145 MMHG

## 2025-07-17 PROBLEM — G89.4 CHRONIC PAIN SYNDROME: Status: ACTIVE | Noted: 2025-07-17

## 2025-07-17 PROCEDURE — 99204 OFFICE O/P NEW MOD 45 MIN: CPT

## 2025-07-21 ENCOUNTER — NON-APPOINTMENT (OUTPATIENT)
Age: 85
End: 2025-07-21

## 2025-07-21 ENCOUNTER — APPOINTMENT (OUTPATIENT)
Dept: OTOLARYNGOLOGY | Facility: CLINIC | Age: 85
End: 2025-07-21
Payer: MEDICARE

## 2025-07-21 VITALS — WEIGHT: 182 LBS | HEIGHT: 66 IN | BODY MASS INDEX: 29.25 KG/M2

## 2025-07-21 DIAGNOSIS — H61.303 ACQUIRED STENOSIS OF EXTERNAL EAR CANAL, UNSPECIFIED, BILATERAL: ICD-10-CM

## 2025-07-21 DIAGNOSIS — H90.3 SENSORINEURAL HEARING LOSS, BILATERAL: ICD-10-CM

## 2025-07-21 DIAGNOSIS — H69.93 UNSPECIFIED EUSTACHIAN TUBE DISORDER, BILATERAL: ICD-10-CM

## 2025-07-21 PROCEDURE — G0268 REMOVAL OF IMPACTED WAX MD: CPT

## 2025-07-21 PROCEDURE — 99203 OFFICE O/P NEW LOW 30 MIN: CPT | Mod: 25

## 2025-07-24 ENCOUNTER — NON-APPOINTMENT (OUTPATIENT)
Age: 85
End: 2025-07-24

## 2025-07-25 ENCOUNTER — APPOINTMENT (OUTPATIENT)
Dept: RHEUMATOLOGY | Facility: CLINIC | Age: 85
End: 2025-07-25
Payer: MEDICARE

## 2025-07-25 DIAGNOSIS — R70.0 ELEVATED ERYTHROCYTE SEDIMENTATION RATE: ICD-10-CM

## 2025-07-25 PROCEDURE — 99214 OFFICE O/P EST MOD 30 MIN: CPT | Mod: 93

## 2025-08-05 DIAGNOSIS — R77.8 OTHER SPECIFIED ABNORMALITIES OF PLASMA PROTEINS: ICD-10-CM

## 2025-08-07 DIAGNOSIS — H61.23 IMPACTED CERUMEN, BILATERAL: ICD-10-CM

## 2025-08-08 ENCOUNTER — APPOINTMENT (OUTPATIENT)
Dept: PHYSICAL MEDICINE AND REHAB | Facility: CLINIC | Age: 85
End: 2025-08-08

## 2025-08-08 VITALS
HEIGHT: 66 IN | RESPIRATION RATE: 16 BRPM | WEIGHT: 182 LBS | BODY MASS INDEX: 29.25 KG/M2 | DIASTOLIC BLOOD PRESSURE: 77 MMHG | OXYGEN SATURATION: 94 % | HEART RATE: 89 BPM | SYSTOLIC BLOOD PRESSURE: 148 MMHG

## 2025-08-08 DIAGNOSIS — Z91.81 HISTORY OF FALLING: ICD-10-CM

## 2025-08-08 DIAGNOSIS — M47.816 SPONDYLOSIS W/OUT MYELOPATHY OR RADICULOPATHY, LUMBAR REGION: ICD-10-CM

## 2025-08-08 DIAGNOSIS — M47.814 SPONDYLOSIS W/OUT MYELOPATHY OR RADICULOPATHY, THORACIC REGION: ICD-10-CM

## 2025-08-08 DIAGNOSIS — M62.830 MUSCLE SPASM OF BACK: ICD-10-CM

## 2025-08-08 DIAGNOSIS — R26.9 UNSPECIFIED ABNORMALITIES OF GAIT AND MOBILITY: ICD-10-CM

## 2025-08-08 DIAGNOSIS — M79.18 MYALGIA, OTHER SITE: ICD-10-CM

## 2025-08-08 PROCEDURE — 99214 OFFICE O/P EST MOD 30 MIN: CPT

## 2025-08-08 RX ORDER — METHOCARBAMOL 500 MG/1
500 TABLET, FILM COATED ORAL 4 TIMES DAILY
Qty: 120 | Refills: 0 | Status: ACTIVE | COMMUNITY
Start: 2025-08-08 | End: 1900-01-01

## 2025-09-01 DIAGNOSIS — R77.8 OTHER SPECIFIED ABNORMALITIES OF PLASMA PROTEINS: ICD-10-CM

## 2025-09-02 ENCOUNTER — RESULT REVIEW (OUTPATIENT)
Age: 85
End: 2025-09-02

## 2025-09-02 ENCOUNTER — APPOINTMENT (OUTPATIENT)
Dept: HEMATOLOGY ONCOLOGY | Facility: CLINIC | Age: 85
End: 2025-09-02
Payer: MEDICARE

## 2025-09-02 VITALS — BODY MASS INDEX: 30.18 KG/M2 | WEIGHT: 187 LBS

## 2025-09-02 DIAGNOSIS — D47.2 MONOCLONAL GAMMOPATHY: ICD-10-CM

## 2025-09-02 PROCEDURE — 99204 OFFICE O/P NEW MOD 45 MIN: CPT

## 2025-09-03 DIAGNOSIS — E61.1 IRON DEFICIENCY: ICD-10-CM

## 2025-09-03 LAB — FERRITIN SERPL-MCNC: 11 NG/ML

## 2025-09-04 ENCOUNTER — NON-APPOINTMENT (OUTPATIENT)
Age: 85
End: 2025-09-04

## 2025-09-08 LAB
ALBUMIN MFR SERPL ELPH: 53.6 %
ALBUMIN SERPL-MCNC: 4.2 G/DL
ALBUMIN/GLOB SERPL: 1.2 RATIO
ALPHA1 GLOB MFR SERPL ELPH: 4 %
ALPHA1 GLOB SERPL ELPH-MCNC: 0.3 G/DL
ALPHA2 GLOB MFR SERPL ELPH: 12.6 %
ALPHA2 GLOB SERPL ELPH-MCNC: 1 G/DL
B-GLOBULIN MFR SERPL ELPH: 12.7 %
B-GLOBULIN SERPL ELPH-MCNC: 1 G/DL
DEPRECATED KAPPA LC FREE/LAMBDA SER: 1.32 RATIO
GAMMA GLOB FLD ELPH-MCNC: 1.3 G/DL
GAMMA GLOB MFR SERPL ELPH: 17.1 %
IGA SERPL-MCNC: 292 MG/DL
IGG SERPL-MCNC: 1228 MG/DL
IGM SERPL-MCNC: 224 MG/DL
INTERPRETATION SERPL IEP-IMP: NORMAL
KAPPA LC CSF-MCNC: 3.18 MG/DL
KAPPA LC SERPL-MCNC: 4.2 MG/DL
M PROTEIN MFR SERPL ELPH: NORMAL
M PROTEIN SPEC IFE-MCNC: NORMAL
MONOCLON BAND OBS SERPL: NORMAL
PROT SERPL-MCNC: 7.8 G/DL
PROT SERPL-MCNC: 7.8 G/DL

## 2025-09-11 ENCOUNTER — APPOINTMENT (OUTPATIENT)
Dept: HEMATOLOGY ONCOLOGY | Facility: CLINIC | Age: 85
End: 2025-09-11

## 2025-09-11 VITALS
OXYGEN SATURATION: 96 % | DIASTOLIC BLOOD PRESSURE: 74 MMHG | BODY MASS INDEX: 30.18 KG/M2 | TEMPERATURE: 98 F | WEIGHT: 187 LBS | HEART RATE: 92 BPM | SYSTOLIC BLOOD PRESSURE: 146 MMHG